# Patient Record
Sex: MALE | Race: WHITE | NOT HISPANIC OR LATINO
[De-identification: names, ages, dates, MRNs, and addresses within clinical notes are randomized per-mention and may not be internally consistent; named-entity substitution may affect disease eponyms.]

---

## 2017-11-10 ENCOUNTER — APPOINTMENT (OUTPATIENT)
Dept: CARDIOLOGY | Facility: CLINIC | Age: 62
End: 2017-11-10
Payer: COMMERCIAL

## 2017-11-10 VITALS
SYSTOLIC BLOOD PRESSURE: 110 MMHG | HEART RATE: 68 BPM | DIASTOLIC BLOOD PRESSURE: 76 MMHG | BODY MASS INDEX: 24.65 KG/M2 | WEIGHT: 186 LBS | HEIGHT: 73 IN

## 2017-11-10 PROBLEM — Z00.00 ENCOUNTER FOR PREVENTIVE HEALTH EXAMINATION: Status: ACTIVE | Noted: 2017-11-10

## 2017-11-10 PROCEDURE — 99244 OFF/OP CNSLTJ NEW/EST MOD 40: CPT

## 2017-11-13 RX ORDER — DOXYCYCLINE HYCLATE 100 MG/1
100 CAPSULE ORAL
Qty: 28 | Refills: 0 | Status: COMPLETED | COMMUNITY
Start: 2017-06-24

## 2017-11-13 RX ORDER — PREDNISONE 5 MG/1
5 TABLET ORAL
Qty: 60 | Refills: 0 | Status: COMPLETED | COMMUNITY
Start: 2017-06-19

## 2017-11-15 ENCOUNTER — APPOINTMENT (OUTPATIENT)
Dept: CARDIOLOGY | Facility: CLINIC | Age: 62
End: 2017-11-15
Payer: COMMERCIAL

## 2017-11-15 PROCEDURE — 93224 XTRNL ECG REC UP TO 48 HRS: CPT

## 2017-11-16 ENCOUNTER — APPOINTMENT (OUTPATIENT)
Dept: CARDIOLOGY | Facility: CLINIC | Age: 62
End: 2017-11-16
Payer: COMMERCIAL

## 2017-11-16 PROCEDURE — 93306 TTE W/DOPPLER COMPLETE: CPT

## 2017-11-17 ENCOUNTER — RECORD ABSTRACTING (OUTPATIENT)
Age: 62
End: 2017-11-17

## 2017-11-17 DIAGNOSIS — Z86.79 PERSONAL HISTORY OF OTHER DISEASES OF THE CIRCULATORY SYSTEM: ICD-10-CM

## 2017-11-17 DIAGNOSIS — Z78.9 OTHER SPECIFIED HEALTH STATUS: ICD-10-CM

## 2017-11-17 DIAGNOSIS — M17.10 UNILATERAL PRIMARY OSTEOARTHRITIS, UNSPECIFIED KNEE: ICD-10-CM

## 2017-11-22 ENCOUNTER — APPOINTMENT (OUTPATIENT)
Dept: CARDIOLOGY | Facility: CLINIC | Age: 62
End: 2017-11-22
Payer: COMMERCIAL

## 2017-11-22 VITALS
BODY MASS INDEX: 24.65 KG/M2 | HEIGHT: 73 IN | WEIGHT: 186 LBS | SYSTOLIC BLOOD PRESSURE: 142 MMHG | DIASTOLIC BLOOD PRESSURE: 88 MMHG | HEART RATE: 82 BPM | OXYGEN SATURATION: 98 %

## 2017-11-22 VITALS — WEIGHT: 186 LBS | HEIGHT: 73 IN | BODY MASS INDEX: 24.65 KG/M2

## 2017-11-22 PROCEDURE — 99214 OFFICE O/P EST MOD 30 MIN: CPT

## 2017-11-22 PROCEDURE — 93224 XTRNL ECG REC UP TO 48 HRS: CPT

## 2017-11-22 RX ORDER — MELOXICAM 7.5 MG/1
7.5 TABLET ORAL
Refills: 0 | Status: DISCONTINUED | COMMUNITY
End: 2017-11-22

## 2017-11-22 RX ORDER — TOBRAMYCIN 3 MG/ML
0.3 SOLUTION/ DROPS OPHTHALMIC
Qty: 5 | Refills: 0 | Status: COMPLETED | COMMUNITY
Start: 2017-10-25 | End: 2017-11-22

## 2017-11-22 RX ORDER — ROSUVASTATIN CALCIUM 10 MG/1
10 TABLET, FILM COATED ORAL
Refills: 0 | Status: DISCONTINUED | COMMUNITY
End: 2017-11-22

## 2017-11-29 ENCOUNTER — APPOINTMENT (OUTPATIENT)
Dept: CARDIOLOGY | Facility: CLINIC | Age: 62
End: 2017-11-29

## 2017-11-30 RX ORDER — AMLODIPINE BESYLATE 2.5 MG/1
2.5 TABLET ORAL DAILY
Refills: 0 | Status: DISCONTINUED | COMMUNITY
End: 2017-11-30

## 2018-01-02 ENCOUNTER — APPOINTMENT (OUTPATIENT)
Dept: ELECTROPHYSIOLOGY | Facility: CLINIC | Age: 63
End: 2018-01-02
Payer: COMMERCIAL

## 2018-01-02 VITALS
HEIGHT: 73 IN | BODY MASS INDEX: 24.65 KG/M2 | WEIGHT: 186 LBS | OXYGEN SATURATION: 99 % | DIASTOLIC BLOOD PRESSURE: 70 MMHG | SYSTOLIC BLOOD PRESSURE: 140 MMHG | HEART RATE: 82 BPM | RESPIRATION RATE: 14 BRPM

## 2018-01-02 PROCEDURE — 99245 OFF/OP CONSLTJ NEW/EST HI 55: CPT

## 2018-01-02 PROCEDURE — 93000 ELECTROCARDIOGRAM COMPLETE: CPT

## 2018-01-08 ENCOUNTER — NON-APPOINTMENT (OUTPATIENT)
Age: 63
End: 2018-01-08

## 2018-03-01 ENCOUNTER — OUTPATIENT (OUTPATIENT)
Dept: OUTPATIENT SERVICES | Facility: HOSPITAL | Age: 63
LOS: 1 days | End: 2018-03-01
Payer: COMMERCIAL

## 2018-03-01 VITALS
WEIGHT: 184.97 LBS | RESPIRATION RATE: 18 BRPM | SYSTOLIC BLOOD PRESSURE: 166 MMHG | HEART RATE: 65 BPM | TEMPERATURE: 98 F | OXYGEN SATURATION: 95 % | HEIGHT: 73 IN | DIASTOLIC BLOOD PRESSURE: 83 MMHG

## 2018-03-01 VITALS
SYSTOLIC BLOOD PRESSURE: 166 MMHG | DIASTOLIC BLOOD PRESSURE: 83 MMHG | RESPIRATION RATE: 18 BRPM | HEIGHT: 73 IN | HEART RATE: 69 BPM | TEMPERATURE: 98 F | OXYGEN SATURATION: 95 % | WEIGHT: 184.97 LBS

## 2018-03-01 DIAGNOSIS — M75.120 COMPLETE ROTATOR CUFF TEAR OR RUPTURE OF UNSPECIFIED SHOULDER, NOT SPECIFIED AS TRAUMATIC: Chronic | ICD-10-CM

## 2018-03-01 DIAGNOSIS — Z01.810 ENCOUNTER FOR PREPROCEDURAL CARDIOVASCULAR EXAMINATION: ICD-10-CM

## 2018-03-01 DIAGNOSIS — Z96.659 PRESENCE OF UNSPECIFIED ARTIFICIAL KNEE JOINT: Chronic | ICD-10-CM

## 2018-03-01 DIAGNOSIS — I48.0 PAROXYSMAL ATRIAL FIBRILLATION: ICD-10-CM

## 2018-03-01 DIAGNOSIS — Z98.890 OTHER SPECIFIED POSTPROCEDURAL STATES: Chronic | ICD-10-CM

## 2018-03-01 LAB
ANION GAP SERPL CALC-SCNC: 13 MMOL/L — SIGNIFICANT CHANGE UP (ref 5–17)
APTT BLD: 31.3 SEC — SIGNIFICANT CHANGE UP (ref 27.5–37.4)
BLD GP AB SCN SERPL QL: SIGNIFICANT CHANGE UP
BUN SERPL-MCNC: 25 MG/DL — HIGH (ref 8–20)
CALCIUM SERPL-MCNC: 9.6 MG/DL — SIGNIFICANT CHANGE UP (ref 8.6–10.2)
CHLORIDE SERPL-SCNC: 103 MMOL/L — SIGNIFICANT CHANGE UP (ref 98–107)
CO2 SERPL-SCNC: 25 MMOL/L — SIGNIFICANT CHANGE UP (ref 22–29)
CREAT SERPL-MCNC: 0.84 MG/DL — SIGNIFICANT CHANGE UP (ref 0.5–1.3)
GLUCOSE SERPL-MCNC: 100 MG/DL — SIGNIFICANT CHANGE UP (ref 70–115)
HCT VFR BLD CALC: 41.6 % — LOW (ref 42–52)
HGB BLD-MCNC: 14.7 G/DL — SIGNIFICANT CHANGE UP (ref 14–18)
INR BLD: 1.08 RATIO — SIGNIFICANT CHANGE UP (ref 0.88–1.16)
MAGNESIUM SERPL-MCNC: 2.2 MG/DL — SIGNIFICANT CHANGE UP (ref 1.6–2.6)
MCHC RBC-ENTMCNC: 29.9 PG — SIGNIFICANT CHANGE UP (ref 27–31)
MCHC RBC-ENTMCNC: 35.3 G/DL — SIGNIFICANT CHANGE UP (ref 32–36)
MCV RBC AUTO: 84.6 FL — SIGNIFICANT CHANGE UP (ref 80–94)
PLATELET # BLD AUTO: 163 K/UL — SIGNIFICANT CHANGE UP (ref 150–400)
POTASSIUM SERPL-MCNC: 3.9 MMOL/L — SIGNIFICANT CHANGE UP (ref 3.5–5.3)
POTASSIUM SERPL-SCNC: 3.9 MMOL/L — SIGNIFICANT CHANGE UP (ref 3.5–5.3)
PROTHROM AB SERPL-ACNC: 11.9 SEC — SIGNIFICANT CHANGE UP (ref 9.8–12.7)
RBC # BLD: 4.92 M/UL — SIGNIFICANT CHANGE UP (ref 4.6–6.2)
RBC # FLD: 13.8 % — SIGNIFICANT CHANGE UP (ref 11–15.6)
SODIUM SERPL-SCNC: 141 MMOL/L — SIGNIFICANT CHANGE UP (ref 135–145)
TYPE + AB SCN PNL BLD: SIGNIFICANT CHANGE UP
WBC # BLD: 7.1 K/UL — SIGNIFICANT CHANGE UP (ref 4.8–10.8)
WBC # FLD AUTO: 7.1 K/UL — SIGNIFICANT CHANGE UP (ref 4.8–10.8)

## 2018-03-01 PROCEDURE — 75572 CT HRT W/3D IMAGE: CPT

## 2018-03-01 PROCEDURE — 75572 CT HRT W/3D IMAGE: CPT | Mod: 26

## 2018-03-01 PROCEDURE — 93010 ELECTROCARDIOGRAM REPORT: CPT

## 2018-03-01 NOTE — H&P PST ADULT - PSH
Complete rotator cuff tear  right 2008  S/P hernia repair  inguinal bilateral  S/P knee replacement  right 2005

## 2018-03-01 NOTE — H&P PST ADULT - NSANTHOSAYNRD_GEN_A_CORE
No. ABDI screening performed.  STOP BANG Legend: 0-2 = LOW Risk; 3-4 = INTERMEDIATE Risk; 5-8 = HIGH Risk

## 2018-03-01 NOTE — H&P PST ADULT - HISTORY OF PRESENT ILLNESS
63 yo male with pmhx HTN, chronic myalgia on prednisone and PAF (CHADSVASc-2, HTN, DM) maintained on cardizem who presents for PST for elective cryo AF ablation.  Recent Holter monitor PAF and  paroxysmal atrial flutter 52-160bpm, average HR 73bpm, PVC vs aberrancy. +symptoms of palpitation during PAF.    TTE 11/16/17: LA 3.4cm, nml LVFx, mild MR, mild TR   SPECT 3/3/2015: normal myocardial perfusion and wall motion study 63 yo male with pmhx HTN, chronic myalgia on prednisone and PAF (CHADSVASc-1, HTN) maintained on cardizem who presents for PST for elective cryo AF ablation.  Recent Holter monitor PAF and  paroxysmal atrial flutter 52-160bpm, average HR 73bpm, PVC vs aberrancy. +symptoms of palpitation during PAF.    TTE 11/16/17: LA 3.4cm, nml LVFx, mild MR, mild TR   SPECT 3/3/2015: normal myocardial perfusion and wall motion study 61 yo male with pmhx HTN, chronic myalgia on prednisone and PAF (CHADSVASc-1, HTN) maintained on cardizem who presents for PST for elective cryo AF ablation.  Recent Holter monitor PAF and  paroxysmal atrial flutter 52-160bpm, average HR 73bpm, PVC vs aberrancy. +symptoms of palpitation during PAF, episodes exacerbated by caffeine and alcohol    TTE 11/16/17: LA 3.4cm, nml LVFx, mild MR, mild TR   SPECT 3/3/2015: normal myocardial perfusion and wall motion study

## 2018-03-07 ENCOUNTER — INPATIENT (INPATIENT)
Facility: HOSPITAL | Age: 63
LOS: 0 days | Discharge: HOME | DRG: 274 | End: 2018-03-08
Attending: INTERNAL MEDICINE | Admitting: INTERNAL MEDICINE
Payer: COMMERCIAL

## 2018-03-07 ENCOUNTER — TRANSCRIPTION ENCOUNTER (OUTPATIENT)
Age: 63
End: 2018-03-07

## 2018-03-07 VITALS
OXYGEN SATURATION: 98 % | HEART RATE: 60 BPM | RESPIRATION RATE: 20 BRPM | SYSTOLIC BLOOD PRESSURE: 141 MMHG | TEMPERATURE: 99 F | DIASTOLIC BLOOD PRESSURE: 81 MMHG

## 2018-03-07 DIAGNOSIS — I48.0 PAROXYSMAL ATRIAL FIBRILLATION: ICD-10-CM

## 2018-03-07 DIAGNOSIS — Z96.659 PRESENCE OF UNSPECIFIED ARTIFICIAL KNEE JOINT: Chronic | ICD-10-CM

## 2018-03-07 DIAGNOSIS — Z98.890 OTHER SPECIFIED POSTPROCEDURAL STATES: Chronic | ICD-10-CM

## 2018-03-07 DIAGNOSIS — Z01.810 ENCOUNTER FOR PREPROCEDURAL CARDIOVASCULAR EXAMINATION: ICD-10-CM

## 2018-03-07 DIAGNOSIS — M75.120 COMPLETE ROTATOR CUFF TEAR OR RUPTURE OF UNSPECIFIED SHOULDER, NOT SPECIFIED AS TRAUMATIC: Chronic | ICD-10-CM

## 2018-03-07 LAB — ABO RH CONFIRMATION: SIGNIFICANT CHANGE UP

## 2018-03-07 PROCEDURE — 93656 COMPRE EP EVAL ABLTJ ATR FIB: CPT

## 2018-03-07 PROCEDURE — 93010 ELECTROCARDIOGRAM REPORT: CPT

## 2018-03-07 PROCEDURE — 85610 PROTHROMBIN TIME: CPT

## 2018-03-07 PROCEDURE — G0463: CPT

## 2018-03-07 PROCEDURE — 93005 ELECTROCARDIOGRAM TRACING: CPT

## 2018-03-07 PROCEDURE — 86923 COMPATIBILITY TEST ELECTRIC: CPT

## 2018-03-07 PROCEDURE — 85730 THROMBOPLASTIN TIME PARTIAL: CPT

## 2018-03-07 PROCEDURE — 86850 RBC ANTIBODY SCREEN: CPT

## 2018-03-07 PROCEDURE — 86900 BLOOD TYPING SEROLOGIC ABO: CPT

## 2018-03-07 PROCEDURE — 36415 COLL VENOUS BLD VENIPUNCTURE: CPT

## 2018-03-07 PROCEDURE — 80048 BASIC METABOLIC PNL TOTAL CA: CPT

## 2018-03-07 PROCEDURE — 85027 COMPLETE CBC AUTOMATED: CPT

## 2018-03-07 PROCEDURE — 86901 BLOOD TYPING SEROLOGIC RH(D): CPT

## 2018-03-07 PROCEDURE — 93613 INTRACARDIAC EPHYS 3D MAPG: CPT

## 2018-03-07 PROCEDURE — 93662 INTRACARDIAC ECG (ICE): CPT | Mod: 26

## 2018-03-07 PROCEDURE — 83735 ASSAY OF MAGNESIUM: CPT

## 2018-03-07 RX ORDER — ALPRAZOLAM 0.25 MG
0.25 TABLET ORAL EVERY 6 HOURS
Qty: 0 | Refills: 0 | Status: DISCONTINUED | OUTPATIENT
Start: 2018-03-07 | End: 2018-03-08

## 2018-03-07 RX ORDER — ATORVASTATIN CALCIUM 80 MG/1
20 TABLET, FILM COATED ORAL AT BEDTIME
Qty: 0 | Refills: 0 | Status: DISCONTINUED | OUTPATIENT
Start: 2018-03-07 | End: 2018-03-08

## 2018-03-07 RX ORDER — LOSARTAN POTASSIUM 100 MG/1
100 TABLET, FILM COATED ORAL DAILY
Qty: 0 | Refills: 0 | Status: DISCONTINUED | OUTPATIENT
Start: 2018-03-07 | End: 2018-03-08

## 2018-03-07 RX ORDER — LOSARTAN/HYDROCHLOROTHIAZIDE 100MG-25MG
1 TABLET ORAL
Qty: 0 | Refills: 0 | COMMUNITY

## 2018-03-07 RX ORDER — FENTANYL CITRATE 50 UG/ML
25 INJECTION INTRAVENOUS
Qty: 0 | Refills: 0 | Status: DISCONTINUED | OUTPATIENT
Start: 2018-03-07 | End: 2018-03-08

## 2018-03-07 RX ORDER — OXYCODONE HYDROCHLORIDE 5 MG/1
5 TABLET ORAL EVERY 4 HOURS
Qty: 0 | Refills: 0 | Status: DISCONTINUED | OUTPATIENT
Start: 2018-03-07 | End: 2018-03-08

## 2018-03-07 RX ORDER — RIVAROXABAN 15 MG-20MG
20 KIT ORAL
Qty: 0 | Refills: 0 | Status: DISCONTINUED | OUTPATIENT
Start: 2018-03-07 | End: 2018-03-08

## 2018-03-07 RX ORDER — BENZOCAINE AND MENTHOL 5; 1 G/100ML; G/100ML
1 LIQUID ORAL
Qty: 0 | Refills: 0 | Status: DISCONTINUED | OUTPATIENT
Start: 2018-03-07 | End: 2018-03-08

## 2018-03-07 RX ORDER — ZOLPIDEM TARTRATE 10 MG/1
5 TABLET ORAL AT BEDTIME
Qty: 0 | Refills: 0 | Status: DISCONTINUED | OUTPATIENT
Start: 2018-03-07 | End: 2018-03-08

## 2018-03-07 RX ORDER — ROSUVASTATIN CALCIUM 5 MG/1
1 TABLET ORAL
Qty: 0 | Refills: 0 | COMMUNITY

## 2018-03-07 RX ORDER — ONDANSETRON 8 MG/1
4 TABLET, FILM COATED ORAL EVERY 6 HOURS
Qty: 0 | Refills: 0 | Status: DISCONTINUED | OUTPATIENT
Start: 2018-03-07 | End: 2018-03-08

## 2018-03-07 RX ORDER — PANTOPRAZOLE SODIUM 20 MG/1
40 TABLET, DELAYED RELEASE ORAL
Qty: 0 | Refills: 0 | Status: DISCONTINUED | OUTPATIENT
Start: 2018-03-07 | End: 2018-03-08

## 2018-03-07 RX ORDER — OXYCODONE HYDROCHLORIDE 5 MG/1
10 TABLET ORAL EVERY 4 HOURS
Qty: 0 | Refills: 0 | Status: DISCONTINUED | OUTPATIENT
Start: 2018-03-07 | End: 2018-03-08

## 2018-03-07 RX ORDER — ACETAMINOPHEN 500 MG
650 TABLET ORAL EVERY 6 HOURS
Qty: 0 | Refills: 0 | Status: DISCONTINUED | OUTPATIENT
Start: 2018-03-07 | End: 2018-03-08

## 2018-03-07 RX ORDER — HYDROCHLOROTHIAZIDE 25 MG
25 TABLET ORAL DAILY
Qty: 0 | Refills: 0 | Status: DISCONTINUED | OUTPATIENT
Start: 2018-03-07 | End: 2018-03-08

## 2018-03-07 RX ADMIN — RIVAROXABAN 20 MILLIGRAM(S): KIT at 18:32

## 2018-03-07 RX ADMIN — BENZOCAINE AND MENTHOL 1 LOZENGE: 5; 1 LIQUID ORAL at 19:00

## 2018-03-07 RX ADMIN — ZOLPIDEM TARTRATE 5 MILLIGRAM(S): 10 TABLET ORAL at 21:18

## 2018-03-07 RX ADMIN — Medication 30 MILLILITER(S): at 17:07

## 2018-03-07 RX ADMIN — ATORVASTATIN CALCIUM 20 MILLIGRAM(S): 80 TABLET, FILM COATED ORAL at 21:18

## 2018-03-07 NOTE — DISCHARGE NOTE ADULT - MEDICATION SUMMARY - MEDICATIONS TO STOP TAKING
I will STOP taking the medications listed below when I get home from the hospital:    dilTIAZem 240 mg/24 hours oral tablet, extended release  -- 1 tab(s) by mouth once a day

## 2018-03-07 NOTE — DISCHARGE NOTE ADULT - PATIENT PORTAL LINK FT
You can access the [facility], offered by [facility], by registering with the following website: [email or web address]

## 2018-03-07 NOTE — PROGRESS NOTE ADULT - SUBJECTIVE AND OBJECTIVE BOX
Pt seen and examined in RR s/p cryo AF ablation (PVI) via b/l FV.  Dr Rodrigues bedside, no complaints.  In: 1.1 liter out: zero    ECG: NSR non specific st-t wave changes    MEDICATIONS  (STANDING):  atorvastatin 20 milliGRAM(s) Oral at bedtime  hydrochlorothiazide 25 milliGRAM(s) Oral daily  losartan 100 milliGRAM(s) Oral daily  pantoprazole    Tablet 40 milliGRAM(s) Oral before breakfast  predniSONE   Tablet 10 milliGRAM(s) Oral daily  rivaroxaban 20 milliGRAM(s) Oral <User Schedule>    MEDICATIONS  (PRN):  acetaminophen   Tablet. 650 milliGRAM(s) Oral every 6 hours PRN Mild Pain (1 - 3)  ALPRAZolam 0.25 milliGRAM(s) Oral every 6 hours PRN anxiety and/or insomnia  aluminum hydroxide/magnesium hydroxide/simethicone Suspension 30 milliLiter(s) Oral every 4 hours PRN Dyspepsia  benzocaine 15 mG/menthol 3.6 mG Lozenge 1 Lozenge Oral every 2 hours PRN Sore Throat  fentaNYL    Injectable 25 MICROGram(s) IV Push every 30 minutes PRN Severe Pain (7 - 10)  ondansetron Injectable 4 milliGRAM(s) IV Push every 6 hours PRN Nausea and/or Vomiting  oxyCODONE    IR 10 milliGRAM(s) Oral every 4 hours PRN Severe Pain (7 - 10)  oxyCODONE    IR 5 milliGRAM(s) Oral every 4 hours PRN Moderate Pain (4 - 6)      PAST MEDICAL & SURGICAL HISTORY:  Atrial flutter  Paroxysmal atrial fibrillation  HTN (hypertension)  S/P hernia repair: inguinal bilateral  Complete rotator cuff tear: right 2008  S/P knee replacement: right 2005      Vital Signs Last 24 Hrs  HR: 80  BP: 150/76  RR: 18  SpO2: 93% 2L    Physical Exam:  Constitutional: NAD, sleepy, arousable  Cardiovascular: +S1S2 RRR  Pulmonary: CTA b/l, unlabored  GI: soft NTND +BS  Extremities: no pedal edema, +distal pulses b/l,   Groins: +sutures b/l, left with scant dried heme, no swelling or hematoma  Neuro: non focal, ALCANTARA x4  Right radial lorna site: benign    A/P: 63 yo male with pmhx HTN, chronic myalgia on prednisone and PAF (CHADSVASc-1, HTN) maintained on cardizem who presents for elective cryo AF ablation.  Recent Holter monitor with PAF and  paroxysmal atrial flutter 52-160bpm, average HR 73bpm, PVC vs aberrancy. +symptoms of palpitation during PAF.  He is now s/p cryo AF ablation via bilateral FV, currently in NSR.    -admit to telemetry  -bedrest x 4 hours, groin protocol   -am labs and ECG  -DC radial lorna in am post lab draw  -start xarelto at 7pm and continue nightly - uninterrupted  -add protonix 40mg po daily x 30 days  -discontinue diltiazem  -OOB to chair and ambulate at 8:30pm if groins stable  -prn tylenol, percocet, xanax, zofran    above d/w nursing staff and Dr Telles Pt seen and examined in RR s/p cryo AF ablation (PVI) via b/l FV.  Dr Rodrigues bedside, no complaints.  In: 1.1 liter out: zero    ECG: NSR non specific st-t wave changes    MEDICATIONS  (STANDING):  atorvastatin 20 milliGRAM(s) Oral at bedtime  hydrochlorothiazide 25 milliGRAM(s) Oral daily  losartan 100 milliGRAM(s) Oral daily  pantoprazole    Tablet 40 milliGRAM(s) Oral before breakfast  predniSONE   Tablet 10 milliGRAM(s) Oral daily  rivaroxaban 20 milliGRAM(s) Oral <User Schedule>    MEDICATIONS  (PRN):  acetaminophen   Tablet. 650 milliGRAM(s) Oral every 6 hours PRN Mild Pain (1 - 3)  ALPRAZolam 0.25 milliGRAM(s) Oral every 6 hours PRN anxiety and/or insomnia  aluminum hydroxide/magnesium hydroxide/simethicone Suspension 30 milliLiter(s) Oral every 4 hours PRN Dyspepsia  benzocaine 15 mG/menthol 3.6 mG Lozenge 1 Lozenge Oral every 2 hours PRN Sore Throat  fentaNYL    Injectable 25 MICROGram(s) IV Push every 30 minutes PRN Severe Pain (7 - 10)  ondansetron Injectable 4 milliGRAM(s) IV Push every 6 hours PRN Nausea and/or Vomiting  oxyCODONE    IR 10 milliGRAM(s) Oral every 4 hours PRN Severe Pain (7 - 10)  oxyCODONE    IR 5 milliGRAM(s) Oral every 4 hours PRN Moderate Pain (4 - 6)      PAST MEDICAL & SURGICAL HISTORY:  Atrial flutter  Paroxysmal atrial fibrillation  HTN (hypertension)  S/P hernia repair: inguinal bilateral  Complete rotator cuff tear: right 2008  S/P knee replacement: right 2005      Vital Signs Last 24 Hrs  HR: 80  BP: 150/76  RR: 18  SpO2: 93% 2L    Physical Exam:  Constitutional: NAD, sleepy, arousable  Cardiovascular: +S1S2 RRR  Pulmonary: CTA b/l, unlabored  GI: soft NTND +BS  Extremities: no pedal edema, +distal pulses b/l,   Groins: +sutures b/l, left with scant dried heme, no swelling or hematoma  Neuro: non focal, ALCANTARA x4  Right radial lorna site: benign    A/P: 63 yo male with pmhx HTN, chronic myalgia on prednisone and PAF (CHADSVASc-1, HTN) maintained on cardizem who presents for elective cryo AF ablation.  Recent Holter monitor with PAF and  paroxysmal atrial flutter 52-160bpm, average HR 73bpm, PVC vs aberrancy. +symptoms of palpitation during PAF.  He is now s/p cryo AF ablation via bilateral FV, currently in NSR.    -admit to telemetry  -bedrest x 4 hours, groin protocol   -am labs and ECG  -DC radial lorna in am post lab draw  -start xarelto at 7pm and continue nightly - uninterrupted  -add protonix 40mg po daily x 30 days  -discontinue diltiazem  -OOB to chair and ambulate at 8:30pm if groins stable  -prn tylenol, percocet, xanax, zofran    above d/w nursing staff and Dr Rodrigues

## 2018-03-07 NOTE — DISCHARGE NOTE ADULT - PLAN OF CARE
minimize arrhythmia burden - Bruising at the groin, sometimes extending down the leg, and/or a small lump under the skin at the groin access site is normal and will resolve within 2 – 3 weeks.   - Occasional skipped beats or palpitations that last for a few beats are common and generally resolve within 1-2 months.   - You may walk and take stairs at a regular pace.   - Do not perform any exercise more strenuous than walking for 1 week.   - Do not strain or lift heavy objects for 1 week.  - You may shower the day after the procedure.  - Do not soak in water (such as tub baths, hot tubs, swimming, etc.) for 1 week.   - You may resume all other activities the day after the procedure.  Call your doctor if:   - you notice bleeding, redness, drainage, swelling, increased tenderness or a hot sensation around the catheter insertion site.   - your temperature is greater than 100 degrees F for more than 24 hours.  - your rapid heart rhythm returns.  - you have any questions or concerns regarding the procedure.  If significant bleeding and/or a large lump (the size of a golf ball or bigger) occurs:  - Lie flat and apply continuous direct pressure just above the puncture site for at least 10 minutes  - If the issue resolves, notify your physician immediately.    - If the bleeding cannot be controlled, please seek immediate medical attention.  If you experience increased difficulty breathing or chest pain, or if you faint or have dizzy spells, please seek immediate medical attention.

## 2018-03-07 NOTE — DISCHARGE NOTE ADULT - HOSPITAL COURSE
61 yo male with pmhx HTN, chronic myalgia on prednisone and symptomatic PAF (CHADSVASc-1, HTN) maintained on cardizem. He presented electively 3/7/18 63 yo male with pmhx HTN, chronic myalgia on prednisone and symptomatic PAF (CHADSVASc-1, HTN) maintained on cardizem. He presented electively 3/7/18 and is now s/p successful ablation of atrial fibrillation.

## 2018-03-07 NOTE — DISCHARGE NOTE ADULT - CARE PROVIDER_API CALL
Jose Carlos Rodrigues), Cardiac Electrophysiology; Cardiovascular Disease; Internal Medicine  [address] [place] [address]  Phone: [phone]  Fax: [phone]

## 2018-03-07 NOTE — DISCHARGE NOTE ADULT - MEDICATION SUMMARY - MEDICATIONS TO TAKE
I will START or STAY ON the medications listed below when I get home from the hospital:    predniSONE 10 mg oral tablet  -- 1 tab(s) by mouth once a day  -- Indication: For Steroid    rivaroxaban 20 mg oral tablet  -- 1 tab(s) by mouth   -- Indication: For AFib    rosuvastatin 5 mg oral tablet  -- 1 tab(s) by mouth once a day (at bedtime)  -- Indication: For HLD    losartan-hydroCHLOROthiazide 100 mg-25 mg oral tablet  -- 1 tab(s) by mouth once a day  -- Indication: For HTN    pantoprazole 40 mg oral delayed release tablet  -- 1 tab(s) by mouth once a day (before a meal) x 30 days   -- Indication: For AFib I will START or STAY ON the medications listed below when I get home from the hospital:    predniSONE 10 mg oral tablet  -- 1 tab(s) by mouth once a day  -- Indication: For Steroid    rivaroxaban 20 mg oral tablet  -- 1 tab(s) by mouth once a day (in the evening)   -- Indication: For stroke prevention    rosuvastatin 5 mg oral tablet  -- 1 tab(s) by mouth once a day (at bedtime)  -- Indication: For HLD    losartan-hydroCHLOROthiazide 100 mg-25 mg oral tablet  -- 1 tab(s) by mouth once a day  -- Indication: For HTN    pantoprazole 40 mg oral delayed release tablet  -- 1 tab(s) by mouth once a day (before a meal) x 30 days   -- Indication: For AFib

## 2018-03-07 NOTE — PROGRESS NOTE ADULT - SUBJECTIVE AND OBJECTIVE BOX
Admission Criteria  Please admit the patient to the following service:    Major Criteria:  - Continuous EKG monitoring is required for condition causing arrhythmia (hyperkalemia, etc)  - Significant volume overload > 200 ml      Admit to: (1 Major ciriteria/2 or more minor criteria) Patient is being admitted to the inpatient service due to high risk characteristics and need for further management/monitoring and is considered to be at a significantly increased risk of major adverse cardiac and vascular events if discharged.

## 2018-03-07 NOTE — DISCHARGE NOTE ADULT - CARE PLAN
Principal Discharge DX:	Paroxysmal atrial fibrillation  Goal:	minimize arrhythmia burden  Assessment and plan of treatment:	- Bruising at the groin, sometimes extending down the leg, and/or a small lump under the skin at the groin access site is normal and will resolve within 2 – 3 weeks.   - Occasional skipped beats or palpitations that last for a few beats are common and generally resolve within 1-2 months.   - You may walk and take stairs at a regular pace.   - Do not perform any exercise more strenuous than walking for 1 week.   - Do not strain or lift heavy objects for 1 week.  - You may shower the day after the procedure.  - Do not soak in water (such as tub baths, hot tubs, swimming, etc.) for 1 week.   - You may resume all other activities the day after the procedure.  Call your doctor if:   - you notice bleeding, redness, drainage, swelling, increased tenderness or a hot sensation around the catheter insertion site.   - your temperature is greater than 100 degrees F for more than 24 hours.  - your rapid heart rhythm returns.  - you have any questions or concerns regarding the procedure.  If significant bleeding and/or a large lump (the size of a golf ball or bigger) occurs:  - Lie flat and apply continuous direct pressure just above the puncture site for at least 10 minutes  - If the issue resolves, notify your physician immediately.    - If the bleeding cannot be controlled, please seek immediate medical attention.  If you experience increased difficulty breathing or chest pain, or if you faint or have dizzy spells, please seek immediate medical attention.

## 2018-03-08 VITALS — HEART RATE: 84 BPM | RESPIRATION RATE: 16 BRPM | TEMPERATURE: 99 F | OXYGEN SATURATION: 95 %

## 2018-03-08 LAB
ANION GAP SERPL CALC-SCNC: 16 MMOL/L — SIGNIFICANT CHANGE UP (ref 5–17)
BUN SERPL-MCNC: 18 MG/DL — SIGNIFICANT CHANGE UP (ref 8–20)
CALCIUM SERPL-MCNC: 9.1 MG/DL — SIGNIFICANT CHANGE UP (ref 8.6–10.2)
CHLORIDE SERPL-SCNC: 101 MMOL/L — SIGNIFICANT CHANGE UP (ref 98–107)
CO2 SERPL-SCNC: 21 MMOL/L — LOW (ref 22–29)
CREAT SERPL-MCNC: 0.66 MG/DL — SIGNIFICANT CHANGE UP (ref 0.5–1.3)
GLUCOSE SERPL-MCNC: 134 MG/DL — HIGH (ref 70–115)
HCT VFR BLD CALC: 36.8 % — LOW (ref 42–52)
HGB BLD-MCNC: 13.3 G/DL — LOW (ref 14–18)
MAGNESIUM SERPL-MCNC: 1.9 MG/DL — SIGNIFICANT CHANGE UP (ref 1.6–2.6)
MCHC RBC-ENTMCNC: 30.4 PG — SIGNIFICANT CHANGE UP (ref 27–31)
MCHC RBC-ENTMCNC: 36.1 G/DL — HIGH (ref 32–36)
MCV RBC AUTO: 84 FL — SIGNIFICANT CHANGE UP (ref 80–94)
PLATELET # BLD AUTO: 118 K/UL — LOW (ref 150–400)
POTASSIUM SERPL-MCNC: 3.7 MMOL/L — SIGNIFICANT CHANGE UP (ref 3.5–5.3)
POTASSIUM SERPL-SCNC: 3.7 MMOL/L — SIGNIFICANT CHANGE UP (ref 3.5–5.3)
RBC # BLD: 4.38 M/UL — LOW (ref 4.6–6.2)
RBC # FLD: 13.4 % — SIGNIFICANT CHANGE UP (ref 11–15.6)
SODIUM SERPL-SCNC: 138 MMOL/L — SIGNIFICANT CHANGE UP (ref 135–145)
WBC # BLD: 11.4 K/UL — HIGH (ref 4.8–10.8)
WBC # FLD AUTO: 11.4 K/UL — HIGH (ref 4.8–10.8)

## 2018-03-08 PROCEDURE — 93005 ELECTROCARDIOGRAM TRACING: CPT

## 2018-03-08 PROCEDURE — 80048 BASIC METABOLIC PNL TOTAL CA: CPT

## 2018-03-08 PROCEDURE — 83735 ASSAY OF MAGNESIUM: CPT

## 2018-03-08 PROCEDURE — C1730: CPT

## 2018-03-08 PROCEDURE — C1759: CPT

## 2018-03-08 PROCEDURE — 36415 COLL VENOUS BLD VENIPUNCTURE: CPT

## 2018-03-08 PROCEDURE — 85027 COMPLETE CBC AUTOMATED: CPT

## 2018-03-08 PROCEDURE — 93010 ELECTROCARDIOGRAM REPORT: CPT

## 2018-03-08 PROCEDURE — 93657 TX L/R ATRIAL FIB ADDL: CPT

## 2018-03-08 PROCEDURE — 93656 COMPRE EP EVAL ABLTJ ATR FIB: CPT

## 2018-03-08 PROCEDURE — C1733: CPT

## 2018-03-08 PROCEDURE — 93613 INTRACARDIAC EPHYS 3D MAPG: CPT

## 2018-03-08 PROCEDURE — C1893: CPT

## 2018-03-08 PROCEDURE — C1766: CPT

## 2018-03-08 PROCEDURE — C1894: CPT

## 2018-03-08 PROCEDURE — C1769: CPT

## 2018-03-08 RX ORDER — RIVAROXABAN 15 MG-20MG
1 KIT ORAL
Qty: 0 | Refills: 0 | COMMUNITY
Start: 2018-03-08

## 2018-03-08 RX ORDER — PANTOPRAZOLE SODIUM 20 MG/1
1 TABLET, DELAYED RELEASE ORAL
Qty: 30 | Refills: 0 | OUTPATIENT
Start: 2018-03-08 | End: 2018-04-06

## 2018-03-08 RX ORDER — DILTIAZEM HCL 120 MG
1 CAPSULE, EXT RELEASE 24 HR ORAL
Qty: 0 | Refills: 0 | COMMUNITY

## 2018-03-08 RX ORDER — RIVAROXABAN 15 MG-20MG
1 KIT ORAL
Qty: 30 | Refills: 2 | OUTPATIENT
Start: 2018-03-08 | End: 2018-06-05

## 2018-03-08 RX ADMIN — Medication 25 MILLIGRAM(S): at 06:20

## 2018-03-08 RX ADMIN — PANTOPRAZOLE SODIUM 40 MILLIGRAM(S): 20 TABLET, DELAYED RELEASE ORAL at 06:20

## 2018-03-08 RX ADMIN — Medication 10 MILLIGRAM(S): at 06:20

## 2018-03-08 RX ADMIN — LOSARTAN POTASSIUM 100 MILLIGRAM(S): 100 TABLET, FILM COATED ORAL at 06:20

## 2018-03-08 NOTE — CHART NOTE - NSCHARTNOTEFT_GEN_A_CORE
[place] [address]  Electrophysiology Lab    Atrial Fibrillation Ablation (Pulmonary Vein Isolation)    Electrophysiologic Study with Ablation    Patient Information    Patient Name		Oswaldo Gonzalez  Procedure Date		2018  MRN			[id number]  			1955  Age			62 years  Gender			Male     Electrophysiologist	Jose Carlos Rodrigues MD    Patient History  	This patient is a pleasant 62 year old male w/ recurrent symptomatic paroxysmal atrial fibrillation.    Indication:	PAF (I48.0)    Procedure  Prep    Radial Arterial Line was placed and General anesthesia was used and the patient was intubated by the anesthesia staff.  An esophageal temperature probe was placed and confirmed fluoroscopically at the level of the pulmonary vein ostia.  CASS surface patches were placed for 3D Mapping.  The right and left groins were prepped with chlorhexidine and draped with sterile technique.    Access    Left Femoral venous access was obtained on 2 occasions via seldinger technique.   #11, #7 [place] short sheaths were placed into the left femoral vein.  Right Femoral venous access was obtained on 2 occasions via seldinger technique.  #12 and #7 [place] short sheaths were placed into the right femoral vein.  Anticoagulation was started intravenously with IV Heparin and ACTs were drawn periodically to a target of 350-400 seconds.  An [employer] intracardiac ultrasound catheter was placed into the left sided #11 [place] sheath and brought up to the heart.  Baseline images were obtained.          A decapolar catheter was brought up from the left femoral  and the ability to achieve right sided diaphragmatic stimulation was confirmed via pacing from this catheter.  A second decapolar catheter was brought up from the right femoral vein #7 sheath and brought into the coronary sinus.           Transseptal    An SL1 Catheter was brought into the region just inferior and slightly anterior to the fossa ovalis along the right atrial septum and with a 71cm [place]rough catheter transseptal catheterization was performed with ICE guidance, fluoroscopy as well pressure guidance to confirm the crossing of the septum and a BMW wire was brought into the left superior pulmonary vein. The SL1 was then tracked over the wire and guided into the mid body of the left atrium.    Much care was taken to create a completely bubble free catheter system for left sided catheterization.    A stiff Amplatz J-wire (180cm) was then brought into the left upper pulmonary vein via the SL1 Sheath.  The SL1 Sheath as well as the short #12 Right femoral venous sheath were removed over the Amplatz wire as it was held in place in the left upper pulmonary vein.      An Artic Front Flex Cath Sheath and its dilator was then brought in via the right femoral vein over the Amplatz wire and carefully delivered across the atrial septum as it tracked over the wire and into the main body of the left atrium.  The Amplatz wire and dilator were removed and the Flex Cath Sheath was positioned in the mid-body of the left atrium.  Next a prepped and bubble free Cryo Cath Artic Front Balloon and Achieve mapping Catheter were delivered into the left atrium through the Flex Cath Sheath.    MAP/Ablate    Using the NAVX 3D mapping system and the Achieve Intracardiac mapping catheter as well as with merged data from a previously taken CT Angiogram a shell of the left atrium was rendered with special attention made to the precise location and morphology of each of the pulmonary veins as well as their openings into the left atrium.    The Achieve was directed into each of the Pulmonary Veins and the Artic Front Balloon (28mm – larger sized) was deployed and delivered to the OS of these Veins.  Cryo ablation was performed of each of the.  Entry and Exit block was confirmed using the Achieve Mapping Catheter  During right sided Pulmonary Vein Cryo Ablation diaphragmatic pacing was performed and using manual palpation.  These methods were used to maximize protection from any phrenic nerve injury.    Esophageal temperatures were monitored during Cryo Ablation to avoid injury to the esophagus.       The  Artic Front Balloon and Achieve mapping  Catheter along with the Flex Cath were brought back from the left atrium and down into the Inferior Vena Cava.     The decapolar catheters were then removed from the body.  ICE confirmed no pericardial effusion at the completion of the procedure and the Accunav Catheter was then removed from the body.  Anticoagulation was reversed.       All 4 sheaths were removed and a figure of 8 ethibond stitch was placed in both groins to achieve hemostasis.   Pressure was held and adequate hemostasis was obtained.  The patient was brought to the Recovery Unit for close observation during the post-procedural period.               Results  All 4 pulmonary veins were approached with the Artic Front Balloon catheter and underwent cryoablation.  There was common antrum for the left veins and both upper and lower veins were directly observed as isolated by delivering the ablation lesion to the common antrum as the larger balloon fit very well into this vestibule.  The achieve catheter was placed into the early entrance of each pulmonary vein and the ability to capture the atrium by pacing from these locations with the achieve catheter was confirmed and marked w/ the CASS mapping system.  After ablation applications confirmation of entry block in each of the veins was demonstrated.  Exit blocked was confirmed by pacing from the prior marked location within each of the veins to demonstrate the new creation of block from within the vein appropriately.     All 4 veins were ablated by the completion of the procedure.        LA pressure:  18/4/11mmHg         Contrast:   0cc  EBL <15cc        Normal Sinus Node Testing. Normal AV Node physiology. Normal His-Purkinje system. No evidence of accessory bypass tract.  There was no retrograde atrial Conduction with ventricular pacing even with Isuprel. No inducible supraventricular tachyarrhythmias.  No inducible ventricular tachyarrhythmias.  No inducible arrhythmias.           SCL  1052ms  RI   159ms		QRS  74ms		QT    480ms  		  		  cSNRT <300ms  	   AVNBCL    320ms         VABCL 300ms    Complications: None	    Summary:	   •	Successful Pulmonary Vein Isolation via Cryoballoon ablation      Recommendations:	    1.	Start Xarelto 20mg Qpm – First dose at 5 pm  2.	Protonix  40 mg for up to one month daily  3.	D/C Diltiazem  4.	Observe on telemetry overnight tonight and discharge in the morning if stable.  5.	Remove Figure of 8 stitches from both groin sites in the am prior to discharge home  6.	Followup in the office in 4 weeks           Jose Carlos Rodrigues MD, RS, [facility]   Cardiac Electrophysiologist - SHELLEY Sanchez   of Cardiology  - [employer]

## 2018-03-08 NOTE — PROGRESS NOTE ADULT - SUBJECTIVE AND OBJECTIVE BOX
Patient seen today in bed. No acute overnight complaints. Figure 8 sutures removed without complaint or complication.     EKG: SR at 90 bpm; QRSD 88ms  TELE: SR, no events    MEDICATIONS  (STANDING):  atorvastatin 20 milliGRAM(s) Oral at bedtime  hydrochlorothiazide 25 milliGRAM(s) Oral daily  losartan 100 milliGRAM(s) Oral daily  pantoprazole    Tablet 40 milliGRAM(s) Oral before breakfast  predniSONE   Tablet 10 milliGRAM(s) Oral daily  rivaroxaban 20 milliGRAM(s) Oral <User Schedule>    MEDICATIONS  (PRN):  acetaminophen   Tablet. 650 milliGRAM(s) Oral every 6 hours PRN Mild Pain (1 - 3)  ALPRAZolam 0.25 milliGRAM(s) Oral every 6 hours PRN anxiety and/or insomnia  aluminum hydroxide/magnesium hydroxide/simethicone Suspension 30 milliLiter(s) Oral every 4 hours PRN Dyspepsia  benzocaine 15 mG/menthol 3.6 mG Lozenge 1 Lozenge Oral every 2 hours PRN Sore Throat  fentaNYL    Injectable 25 MICROGram(s) IV Push every 30 minutes PRN Severe Pain (7 - 10)  ondansetron Injectable 4 milliGRAM(s) IV Push every 6 hours PRN Nausea and/or Vomiting  oxyCODONE    IR 10 milliGRAM(s) Oral every 4 hours PRN Severe Pain (7 - 10)  oxyCODONE    IR 5 milliGRAM(s) Oral every 4 hours PRN Moderate Pain (4 - 6)  zolpidem 5 milliGRAM(s) Oral at bedtime PRN Insomnia    Allergies  No Known Allergies    PAST MEDICAL & SURGICAL HISTORY:  Atrial flutter  Paroxysmal atrial fibrillation  HTN (hypertension)  S/P hernia repair: inguinal bilateral  Complete rotator cuff tear: right 2008  S/P knee replacement: right 2005    Vital Signs Last 24 Hrs  T(C): 37.1 (08 Mar 2018 06:19), Max: 37.3 (07 Mar 2018 10:58)  T(F): 98.7 (08 Mar 2018 06:19), Max: 99.1 (07 Mar 2018 10:58)  HR: 84 (08 Mar 2018 06:19) (60 - 88)  BP: 135/72 (07 Mar 2018 18:00) (129/71 - 152/81)  RR: 16 (08 Mar 2018 06:19) (14 - 20)  SpO2: 95% (08 Mar 2018 06:19) (93% - 98%)    Physical Exam:  Constitutional: NAD, AAOx3  Cardiovascular: +S1S2 RRR  Pulmonary: CTA b/l, unlabored  GI: soft NTND +BS  Extremities: no pedal edema  Right Groin: No hematoma, small area of ecchymosis  Left Groin: No hematoma.   Neuro: non focal, ALCANTARA x4    LABS:                        13.3   11.4  )-----------( 118      ( 08 Mar 2018 05:52 )             36.8     03-08    138  |  101  |  18.0  ----------------------------<  134<H>  3.7   |  21.0<L>  |  0.66    Ca    9.1      08 Mar 2018 05:52  Mg     1.9     03-08    A/P  61 yo male with pmhx HTN, chronic myalgia on prednisone and PAF (CHADSVASc-1, HTN) maintained on Cardizem.  He is now s/p cryo AF ablation via bilateral FV, currently in NSR.    - Discharge home today.

## 2018-04-06 PROBLEM — I48.92 UNSPECIFIED ATRIAL FLUTTER: Chronic | Status: ACTIVE | Noted: 2018-03-01

## 2018-04-06 PROBLEM — I48.0 PAROXYSMAL ATRIAL FIBRILLATION: Chronic | Status: ACTIVE | Noted: 2018-03-01

## 2018-04-06 PROBLEM — I10 ESSENTIAL (PRIMARY) HYPERTENSION: Chronic | Status: ACTIVE | Noted: 2018-03-01

## 2018-05-08 ENCOUNTER — APPOINTMENT (OUTPATIENT)
Dept: ELECTROPHYSIOLOGY | Facility: CLINIC | Age: 63
End: 2018-05-08
Payer: COMMERCIAL

## 2018-05-08 ENCOUNTER — NON-APPOINTMENT (OUTPATIENT)
Age: 63
End: 2018-05-08

## 2018-05-08 VITALS
BODY MASS INDEX: 24.52 KG/M2 | WEIGHT: 185 LBS | HEIGHT: 73 IN | DIASTOLIC BLOOD PRESSURE: 70 MMHG | RESPIRATION RATE: 14 BRPM | HEART RATE: 82 BPM | OXYGEN SATURATION: 99 % | SYSTOLIC BLOOD PRESSURE: 126 MMHG

## 2018-05-08 PROCEDURE — 99214 OFFICE O/P EST MOD 30 MIN: CPT

## 2018-05-08 PROCEDURE — 93000 ELECTROCARDIOGRAM COMPLETE: CPT

## 2018-09-28 ENCOUNTER — RECORD ABSTRACTING (OUTPATIENT)
Age: 63
End: 2018-09-28

## 2018-09-28 ENCOUNTER — APPOINTMENT (OUTPATIENT)
Dept: CARDIOLOGY | Facility: CLINIC | Age: 63
End: 2018-09-28
Payer: COMMERCIAL

## 2018-09-28 VITALS
HEIGHT: 73 IN | DIASTOLIC BLOOD PRESSURE: 82 MMHG | HEART RATE: 80 BPM | BODY MASS INDEX: 25.18 KG/M2 | WEIGHT: 190 LBS | OXYGEN SATURATION: 96 % | SYSTOLIC BLOOD PRESSURE: 138 MMHG

## 2018-09-28 PROCEDURE — 99214 OFFICE O/P EST MOD 30 MIN: CPT

## 2019-03-26 ENCOUNTER — RECORD ABSTRACTING (OUTPATIENT)
Age: 64
End: 2019-03-26

## 2019-03-28 ENCOUNTER — NON-APPOINTMENT (OUTPATIENT)
Age: 64
End: 2019-03-28

## 2019-03-28 ENCOUNTER — RECORD ABSTRACTING (OUTPATIENT)
Age: 64
End: 2019-03-28

## 2019-03-28 ENCOUNTER — APPOINTMENT (OUTPATIENT)
Dept: CARDIOLOGY | Facility: CLINIC | Age: 64
End: 2019-03-28
Payer: COMMERCIAL

## 2019-03-28 VITALS
HEART RATE: 79 BPM | WEIGHT: 172 LBS | SYSTOLIC BLOOD PRESSURE: 120 MMHG | BODY MASS INDEX: 22.8 KG/M2 | HEIGHT: 73 IN | OXYGEN SATURATION: 98 % | DIASTOLIC BLOOD PRESSURE: 70 MMHG

## 2019-03-28 DIAGNOSIS — Z87.898 PERSONAL HISTORY OF OTHER SPECIFIED CONDITIONS: ICD-10-CM

## 2019-03-28 PROCEDURE — 99214 OFFICE O/P EST MOD 30 MIN: CPT

## 2019-03-28 PROCEDURE — 93000 ELECTROCARDIOGRAM COMPLETE: CPT

## 2019-03-28 NOTE — ASSESSMENT
[FreeTextEntry1] : ASSESSMENT AND RECOMMENDATIONS:\par This is a 63-year-old white male with past medical history as detailed above, seen today, with the following active issues:\par Paroxysmal atrial fibrillation/aflutter, status post cryoablation.  No symptomatic recurrence.  We discussed potential recurrence and instructions to contact us for symptoms.  He has been taken off diltiazem and Xarelto by Dr. Rodrigues.\par Hypertension.   No CHF, no renal insufficiency. Nonsmoker. very well controlled\par Dyslipidemia.  He has been tolerating his rosuvastatin 10 mg . \par \par Recommended to follow his pulse and blood pressure regularly.\par Recommended to follow electrolytes. Lacunar regular basis\par \par Counseling regarding low saturated fat, salt and carbohydrate intake was reviewed. Active lifestyle and regular. Exercise along with weight management is advised.\par All the above were at length reviewed. Answered all the questions. Thank you very much for this kind referral. Please do not hesitate to give me a call for any question.\par Part of this transcription was done with voice recognition software and phonetically similar errors are common. I apologize for that. Please donot hesitate to call for any questions due to above.\par Follow up in one year or for any change\par

## 2019-03-28 NOTE — HISTORY OF PRESENT ILLNESS
[FreeTextEntry1] : HPI:\par I had the pleasure of seeing Mr. Gonzalez on September 28, 2018.  He presents to the office for yearly cardiovascular follow up.  Since he was last seen, he was evaluated by Dr. Rodrigues for his paroxysmal atrial fibrillation.  He underwent cryoablation in March 2018.  This appears to be successful with no recurrent symptoms.  He was taken off Xarelto and off diltiazem by Dr. Rodrigues.  On this regimen, his blood pressures have gradually crept up.  They currently range as low as 130 and as high as 150.  This has not been associated to any symptoms.  He denies any change in dietary habits including any change in salt intake.  He denies over-the-counter allergy preparations or caffeine use.\par \par Atrial fibrillation.  As above.  No recurrent symptoms.\par Dyslipidemia.  Never did restart rosuvastatin.  He has gradually weaned off the prednisone taking for his polymyalgia.  He has been off steroids altogether for several weeks with no myalgias.\par \par Cardiovascular review of systems is negative for chest pain, presyncope, syncope, edema, claudication, or CNS events.\par

## 2019-03-28 NOTE — PHYSICAL EXAM
[General Appearance - Well Developed] : well developed [Normal Appearance] : normal appearance [Well Groomed] : well groomed [General Appearance - Well Nourished] : well nourished [No Deformities] : no deformities [General Appearance - In No Acute Distress] : no acute distress [Normal Conjunctiva] : the conjunctiva exhibited no abnormalities [Eyelids - No Xanthelasma] : the eyelids demonstrated no xanthelasmas [Normal Oral Mucosa] : normal oral mucosa [No Oral Pallor] : no oral pallor [No Oral Cyanosis] : no oral cyanosis [JVD Elevated _____cm] : JVD elevated [unfilled] ~U cm above clavicle [Respiration, Rhythm And Depth] : normal respiratory rhythm and effort [Exaggerated Use Of Accessory Muscles For Inspiration] : no accessory muscle use [Auscultation Breath Sounds / Voice Sounds] : lungs were clear to auscultation bilaterally [Heart Rate And Rhythm] : heart rate and rhythm were normal [Heart Sounds] : normal S1 and S2 [Murmurs] : no murmurs present [Arterial Pulses Normal] : the arterial pulses were normal [Edema] : no peripheral edema present [Veins - Varicosity Changes] : no varicosital changes were noted in the lower extremities [Bowel Sounds] : normal bowel sounds [Abdomen Soft] : soft [Abnormal Walk] : normal gait [FreeTextEntry1] : No kyphosis [Nail Clubbing] : no clubbing of the fingernails [Cyanosis, Localized] : no localized cyanosis [Petechial Hemorrhages (___cm)] : no petechial hemorrhages [] : no ischemic changes [Skin Color & Pigmentation] : normal skin color and pigmentation [Skin Turgor] : normal skin turgor [Oriented To Time, Place, And Person] : oriented to person, place, and time [Affect] : the affect was normal [Mood] : the mood was normal [Memory Recent] : recent memory was not impaired [Memory Remote] : remote memory was not impaired [No Anxiety] : not feeling anxious

## 2019-10-10 ENCOUNTER — MEDICATION RENEWAL (OUTPATIENT)
Age: 64
End: 2019-10-10

## 2019-11-21 ENCOUNTER — MEDICATION RENEWAL (OUTPATIENT)
Age: 64
End: 2019-11-21

## 2019-11-21 RX ORDER — LOSARTAN POTASSIUM AND HYDROCHLOROTHIAZIDE 25; 100 MG/1; MG/1
100-25 TABLET ORAL DAILY
Qty: 90 | Refills: 3 | Status: DISCONTINUED | COMMUNITY
End: 2019-11-21

## 2020-03-13 ENCOUNTER — APPOINTMENT (OUTPATIENT)
Dept: CARDIOLOGY | Facility: CLINIC | Age: 65
End: 2020-03-13
Payer: COMMERCIAL

## 2020-03-13 ENCOUNTER — NON-APPOINTMENT (OUTPATIENT)
Age: 65
End: 2020-03-13

## 2020-03-13 VITALS
SYSTOLIC BLOOD PRESSURE: 118 MMHG | OXYGEN SATURATION: 97 % | DIASTOLIC BLOOD PRESSURE: 62 MMHG | HEIGHT: 73 IN | HEART RATE: 75 BPM | BODY MASS INDEX: 22.66 KG/M2 | WEIGHT: 171 LBS

## 2020-03-13 PROCEDURE — 99214 OFFICE O/P EST MOD 30 MIN: CPT

## 2020-03-13 PROCEDURE — 93000 ELECTROCARDIOGRAM COMPLETE: CPT

## 2020-03-13 RX ORDER — PREDNISONE 1 MG/1
1 TABLET ORAL DAILY
Refills: 0 | Status: DISCONTINUED | COMMUNITY
End: 2020-03-13

## 2020-03-13 RX ORDER — ASPIRIN ENTERIC COATED TABLETS 81 MG 81 MG/1
81 TABLET, DELAYED RELEASE ORAL DAILY
Qty: 30 | Refills: 0 | Status: ACTIVE | COMMUNITY
Start: 2020-03-13

## 2020-03-13 RX ORDER — PREDNISONE 5 MG/1
5 TABLET ORAL DAILY
Refills: 0 | Status: DISCONTINUED | COMMUNITY
End: 2020-03-13

## 2020-03-13 RX ORDER — ROSUVASTATIN CALCIUM 10 MG/1
10 TABLET, FILM COATED ORAL
Refills: 0 | Status: DISCONTINUED | COMMUNITY
End: 2020-03-13

## 2020-03-13 NOTE — HISTORY OF PRESENT ILLNESS
[FreeTextEntry1] : HPI:\par \par Atrial fibrillation.  As above.  No recurrent symptoms. cryoablation in March 2018. He was taken off Xarelto and off diltiazem by Dr. Rodrigues\par Dyslipidemia.  Never did restart rosuvastatin.  He has gradually weaned off the prednisone taking for his polymyalgia.  He has been off steroids altogether for several weeks with no myalgias.\par Mild mitral regurgitation

## 2020-03-13 NOTE — REASON FOR VISIT
[Follow-Up - Clinic] : a clinic follow-up of [Atrial Fibrillation] : atrial fibrillation [Hyperlipidemia] : hyperlipidemia [Hypertension] : hypertension [FreeTextEntry1] : 64-year-old gentleman is seen in the office for follow-up consultation\par Since last seen I reviewed his labs\par He has no chest pain shortness of breath palpitation lightheaded dizziness\par He has no nausea vomiting hemoptysis hematemesis melena or dark loose stool\par He has no claudication pain \par He has lost weight\par He has no recent hospital admission

## 2020-03-13 NOTE — PHYSICAL EXAM
[General Appearance - Well Developed] : well developed [Normal Appearance] : normal appearance [Well Groomed] : well groomed [General Appearance - Well Nourished] : well nourished [No Deformities] : no deformities [General Appearance - In No Acute Distress] : no acute distress [Normal Conjunctiva] : the conjunctiva exhibited no abnormalities [Eyelids - No Xanthelasma] : the eyelids demonstrated no xanthelasmas [Normal Oral Mucosa] : normal oral mucosa [No Oral Pallor] : no oral pallor [No Oral Cyanosis] : no oral cyanosis [JVD Elevated _____cm] : JVD elevated [unfilled] ~U cm above clavicle [Respiration, Rhythm And Depth] : normal respiratory rhythm and effort [Exaggerated Use Of Accessory Muscles For Inspiration] : no accessory muscle use [Auscultation Breath Sounds / Voice Sounds] : lungs were clear to auscultation bilaterally [Heart Rate And Rhythm] : heart rate and rhythm were normal [Heart Sounds] : normal S1 and S2 [Murmurs] : no murmurs present [Arterial Pulses Normal] : the arterial pulses were normal [Edema] : no peripheral edema present [Veins - Varicosity Changes] : no varicosital changes were noted in the lower extremities [Bowel Sounds] : normal bowel sounds [Abdomen Soft] : soft [Abnormal Walk] : normal gait [Nail Clubbing] : no clubbing of the fingernails [Cyanosis, Localized] : no localized cyanosis [Petechial Hemorrhages (___cm)] : no petechial hemorrhages [] : no ischemic changes [Skin Color & Pigmentation] : normal skin color and pigmentation [Skin Turgor] : normal skin turgor [Oriented To Time, Place, And Person] : oriented to person, place, and time [Affect] : the affect was normal [Mood] : the mood was normal [Memory Recent] : recent memory was not impaired [Memory Remote] : remote memory was not impaired [No Anxiety] : not feeling anxious [FreeTextEntry1] : No kyphosis

## 2020-03-13 NOTE — ASSESSMENT
[FreeTextEntry1] : ASSESSMENT AND RECOMMENDATIONS:\par This is a 64-year-old white male with past medical history as detailed above, seen today, with the following active issues:\par Paroxysmal atrial fibrillation/aflutter, status post cryoablation.  No symptomatic recurrence.  We discussed potential recurrence and instructions to contact us for symptoms.  He has been taken off diltiazem and Xarelto by Dr. Rodrigues.  Aspirin 81 mg in presence of multiple cardiovascular risk factors.\par Hypertension.   No CHF, no renal insufficiency. Nonsmoker. very well controlled\par Dyslipidemia.  He has been taking rosuvastatin at 5 mg.  Increase to 10 mg.  Follow-up labs.\par Mitral insufficiency.  History of paroxysmal atrial fibrillation and hypertension.  Echocardiogram will be done to evaluate LV ejection fraction left atrial size severity of mitral regurgitation.\par \par Counseling regarding low saturated fat, salt and carbohydrate intake was reviewed. Active lifestyle and regular. Exercise along with weight management is advised.\par All the above were at length reviewed. Answered all the questions. Thank you very much for this kind referral. Please do not hesitate to give me a call for any question.\par Part of this transcription was done with voice recognition software and phonetically similar errors are common. I apologize for that. Please donot hesitate to call for any questions due to above.\par Follow up in one year or for any change\par

## 2020-03-17 ENCOUNTER — APPOINTMENT (OUTPATIENT)
Dept: CARDIOLOGY | Facility: CLINIC | Age: 65
End: 2020-03-17
Payer: COMMERCIAL

## 2020-03-17 PROCEDURE — 93306 TTE W/DOPPLER COMPLETE: CPT

## 2020-12-01 ENCOUNTER — APPOINTMENT (OUTPATIENT)
Dept: CARDIOLOGY | Facility: CLINIC | Age: 65
End: 2020-12-01
Payer: COMMERCIAL

## 2020-12-01 ENCOUNTER — NON-APPOINTMENT (OUTPATIENT)
Age: 65
End: 2020-12-01

## 2020-12-01 VITALS
OXYGEN SATURATION: 96 % | SYSTOLIC BLOOD PRESSURE: 128 MMHG | HEART RATE: 85 BPM | BODY MASS INDEX: 24.39 KG/M2 | WEIGHT: 184 LBS | DIASTOLIC BLOOD PRESSURE: 74 MMHG | HEIGHT: 73 IN

## 2020-12-01 PROCEDURE — 93000 ELECTROCARDIOGRAM COMPLETE: CPT

## 2020-12-01 PROCEDURE — 99072 ADDL SUPL MATRL&STAF TM PHE: CPT

## 2020-12-01 PROCEDURE — 99214 OFFICE O/P EST MOD 30 MIN: CPT

## 2020-12-01 NOTE — HISTORY OF PRESENT ILLNESS
[FreeTextEntry1] : HIMANSHU MATA  is a 65 year M  who presents today Dec 01, 2020 requesting to be seen for lower extremity swelling. Swelling has been occurring daily for approx 1 week. Swelling is resolved in morning upon waking up and progressively worsens as the day goes on. There is no calf tenderness. No skin changes. Slight weight gain with the pandemic. Increased salt intake with Thanksgiving. \par \par Today he denies chest pain, pressure, unusual shortness of breath, lightheadedness, dizziness, near syncope or syncope. \par \par There is no prior history of myocardial infarction, coronary revascularization, history of ischemic heart disease, or symptomatic congestive heart failure. \par \par Atrial fibrillation.  As above.  No recurrent symptoms. cryoablation in March 2018. He was taken off Xarelto and off diltiazem by Dr. Rodrigues\par Dyslipidemia.  Never did restart rosuvastatin.  He has gradually weaned off the prednisone taking for his polymyalgia.  He has been off steroids altogether for several weeks with no myalgias.\par Mild mitral regurgitation

## 2020-12-01 NOTE — PHYSICAL EXAM
[General Appearance - Well Developed] : well developed [Normal Appearance] : normal appearance [Well Groomed] : well groomed [General Appearance - Well Nourished] : well nourished [No Deformities] : no deformities [General Appearance - In No Acute Distress] : no acute distress [Normal Conjunctiva] : the conjunctiva exhibited no abnormalities [Eyelids - No Xanthelasma] : the eyelids demonstrated no xanthelasmas [Normal Oral Mucosa] : normal oral mucosa [No Oral Pallor] : no oral pallor [No Oral Cyanosis] : no oral cyanosis [JVD Elevated _____cm] : JVD elevated [unfilled] ~U cm above clavicle [Respiration, Rhythm And Depth] : normal respiratory rhythm and effort [Exaggerated Use Of Accessory Muscles For Inspiration] : no accessory muscle use [Auscultation Breath Sounds / Voice Sounds] : lungs were clear to auscultation bilaterally [Heart Rate And Rhythm] : heart rate and rhythm were normal [Heart Sounds] : normal S1 and S2 [Murmurs] : no murmurs present [Arterial Pulses Normal] : the arterial pulses were normal [Veins - Varicosity Changes] : no varicosital changes were noted in the lower extremities [Bowel Sounds] : normal bowel sounds [Abdomen Soft] : soft [Abnormal Walk] : normal gait [FreeTextEntry1] : No kyphosis [Nail Clubbing] : no clubbing of the fingernails [Cyanosis, Localized] : no localized cyanosis [Petechial Hemorrhages (___cm)] : no petechial hemorrhages [] : no ischemic changes [Skin Color & Pigmentation] : normal skin color and pigmentation [Skin Turgor] : normal skin turgor [Oriented To Time, Place, And Person] : oriented to person, place, and time [Affect] : the affect was normal [Mood] : the mood was normal [Memory Recent] : recent memory was not impaired [Memory Remote] : remote memory was not impaired [No Anxiety] : not feeling anxious

## 2020-12-01 NOTE — ASSESSMENT
[FreeTextEntry1] : HIMANSHU MATA  is a 65 year M  who presents today Dec 01, 2020 with the above history and the following active issues. \par \par Lower extremity swelling. Recommend follow-up blood work including CBC, CMP, BNP. Low sodium diet. Reduce Amlodipine to 5mg QD. Monitor BP at home and bring monitor to next OV for assessment of accuracy. \par Close clinical follow-up. \par \par Paroxysmal atrial fibrillation/aflutter, status post cryoablation.  No symptomatic recurrence.  We discussed potential recurrence and instructions to contact us for symptoms.  He has been taken off diltiazem and Xarelto by Dr. Rodrigues.  Aspirin 81 mg in presence of multiple cardiovascular risk factors. EKG today demonstrates NSR.\par \par Hypertension.   No CHF, no renal insufficiency. Nonsmoker. very well controlled\par \par Dyslipidemia.  He has been taking rosuvastatin at 5 mg.  Increase to 10 mg.  Follow-up labs.\par Mitral insufficiency.  History of paroxysmal atrial fibrillation and hypertension.  Echocardiogram will be done to evaluate LV ejection fraction left atrial size severity of mitral regurgitation.\par \par Red flag symptoms which would warrant sooner emergent evaluation reviewed with the patient. \par Questions and concerns were addressed and answered. \par \par Clinical follow-up in 2 weeks\par \par Sincerely,\par \par Jeni Pool PA-C\par Patients history, testing and plan reviewed with supervising MD: Dr. Kellen Caraballo

## 2020-12-29 ENCOUNTER — APPOINTMENT (OUTPATIENT)
Dept: CARDIOLOGY | Facility: CLINIC | Age: 65
End: 2020-12-29
Payer: COMMERCIAL

## 2020-12-29 VITALS
WEIGHT: 180 LBS | HEART RATE: 79 BPM | OXYGEN SATURATION: 95 % | SYSTOLIC BLOOD PRESSURE: 138 MMHG | DIASTOLIC BLOOD PRESSURE: 80 MMHG | BODY MASS INDEX: 23.86 KG/M2 | TEMPERATURE: 96.8 F | HEIGHT: 73 IN

## 2020-12-29 PROCEDURE — 99214 OFFICE O/P EST MOD 30 MIN: CPT

## 2020-12-29 PROCEDURE — 99072 ADDL SUPL MATRL&STAF TM PHE: CPT

## 2020-12-29 NOTE — ASSESSMENT
[FreeTextEntry1] : HIMANSHU MATA  is a 65 year M  who presents today Dec 01, 2020 with the above history and the following active issues. \par \par Lower extremity swelling. Recommend follow-up blood work including CBC, CMP, BNP. Low sodium diet. Reduce Amlodipine to 5mg QD. Monitor BP at home and bring monitor to next OV for assessment of accuracy. \par Close clinical follow-up. \par \par Paroxysmal atrial fibrillation/aflutter, status post cryoablation.  No symptomatic recurrence.  We discussed potential recurrence and instructions to contact us for symptoms.  He has been taken off diltiazem and Xarelto by Dr. Rodrigues.  Aspirin 81 mg in presence of multiple cardiovascular risk factors. EKG today demonstrates NSR.\par \par Hypertension.   No CHF, no renal insufficiency. Nonsmoker. very well controlled\par \par Dyslipidemia.  He has been taking rosuvastatin at 5 mg.  Increase to 10 mg.  Follow-up labs.\par Mitral insufficiency.  History of paroxysmal atrial fibrillation and hypertension.  Echocardiogram will be done to evaluate LV ejection fraction left atrial size severity of mitral regurgitation.\par \par \par \par Clinical follow-up in 2 weeks\par \par Sincerely,\par \par Jeni Pool PA-C\par Patients history, testing and plan reviewed with supervising MD: Dr. Kellen Caraballo

## 2020-12-29 NOTE — PHYSICAL EXAM
[General Appearance - Well Developed] : well developed [Normal Appearance] : normal appearance [Well Groomed] : well groomed [General Appearance - Well Nourished] : well nourished [No Deformities] : no deformities [General Appearance - In No Acute Distress] : no acute distress [Normal Conjunctiva] : the conjunctiva exhibited no abnormalities [Eyelids - No Xanthelasma] : the eyelids demonstrated no xanthelasmas [Normal Oral Mucosa] : normal oral mucosa [No Oral Pallor] : no oral pallor [No Oral Cyanosis] : no oral cyanosis [JVD Elevated _____cm] : JVD elevated [unfilled] ~U cm above clavicle [Respiration, Rhythm And Depth] : normal respiratory rhythm and effort [Exaggerated Use Of Accessory Muscles For Inspiration] : no accessory muscle use [Auscultation Breath Sounds / Voice Sounds] : lungs were clear to auscultation bilaterally [Heart Rate And Rhythm] : heart rate and rhythm were normal [Heart Sounds] : normal S1 and S2 [Murmurs] : no murmurs present [Arterial Pulses Normal] : the arterial pulses were normal [Veins - Varicosity Changes] : no varicosital changes were noted in the lower extremities [Bowel Sounds] : normal bowel sounds [Abdomen Soft] : soft [Abnormal Walk] : normal gait [Nail Clubbing] : no clubbing of the fingernails [Cyanosis, Localized] : no localized cyanosis [Petechial Hemorrhages (___cm)] : no petechial hemorrhages [] : no ischemic changes [Skin Color & Pigmentation] : normal skin color and pigmentation [Skin Turgor] : normal skin turgor [Oriented To Time, Place, And Person] : oriented to person, place, and time [Affect] : the affect was normal [Mood] : the mood was normal [Memory Recent] : recent memory was not impaired [Memory Remote] : remote memory was not impaired [No Anxiety] : not feeling anxious [FreeTextEntry1] : No kyphosis

## 2020-12-31 ENCOUNTER — APPOINTMENT (OUTPATIENT)
Dept: CARDIOLOGY | Facility: CLINIC | Age: 65
End: 2020-12-31

## 2021-01-26 ENCOUNTER — APPOINTMENT (OUTPATIENT)
Dept: CARDIOLOGY | Facility: CLINIC | Age: 66
End: 2021-01-26
Payer: COMMERCIAL

## 2021-01-26 VITALS
BODY MASS INDEX: 23.46 KG/M2 | DIASTOLIC BLOOD PRESSURE: 70 MMHG | OXYGEN SATURATION: 98 % | WEIGHT: 177 LBS | SYSTOLIC BLOOD PRESSURE: 120 MMHG | HEIGHT: 73 IN | HEART RATE: 79 BPM

## 2021-01-26 PROCEDURE — 99214 OFFICE O/P EST MOD 30 MIN: CPT

## 2021-01-26 PROCEDURE — 99072 ADDL SUPL MATRL&STAF TM PHE: CPT

## 2021-01-26 NOTE — ASSESSMENT
[FreeTextEntry1] : HIMANSHU MATA  is a 65 year M  who presents today Jan 26, 2021 with the above history and the following active issues. \par \par History of lower extremity swelling on Amlodipine 10mg which has reolved with reduction of dose to Amlodipine to 5mg QD. Blood pressure well controlled on my assessment. Continue to take current dose. If there is recurrence of swelling he is aware to call the office. \par \par Paroxysmal atrial fibrillation/aflutter, status post cryoablation.  No symptomatic recurrence.  We discussed potential recurrence and instructions to contact us for symptoms.  He has been taken off diltiazem and Xarelto by Dr. Rodrigues.  Aspirin 81 mg in presence of multiple cardiovascular risk factors. EKG today demonstrates NSR.\par \par Hypertension.   No CHF, no renal insufficiency. Nonsmoker. Very well controlled\par \par Dyslipidemia.  He has been taking rosuvastatin 10 mg. Tolerating without adverse symptoms.\par Lifestyle and risk factor modification.\par \par Minimal mitral valve regurgitation by echo 3/2020. Continue to monitor. \par Does not require SBE prophylaxis. \par \par Red flag symptoms which would warrant sooner emergent evaluation reviewed with the patient. \par Questions and concerns were addressed and answered. \par \par Clinical follow-up in 6 months unless symptoms warrant sooner emergent evaluation.\par \par Sincerely,\par \par Jeni Pool PA-C\par Patients history, testing and plan reviewed with supervising MD: Dr. Patrick Valentino

## 2021-01-26 NOTE — HISTORY OF PRESENT ILLNESS
[FreeTextEntry1] : HIMANSHU MATA  is a 65 year M  who presents today Jan 26, 2021 in clinical follow-up and for reassessment of blood pressure on lower dose of Amlodipine. There was lower extremity swelling with taking Amlodipine 10mg QD. On reduced dose of 5mg QD symptoms of swelling have fully resolved and he is feeling well. Blood pressure well controlled on my assessment. \par Exercising on a regular basis with no new exertional complaints. \par \par Today he denies chest pain, pressure, unusual shortness of breath, lightheadedness, dizziness, near syncope or syncope. \par \par There is no prior history of myocardial infarction, coronary revascularization, history of ischemic heart disease, or symptomatic congestive heart failure. \par \par Atrial fibrillation.  As above.  No recurrent symptoms. cryoablation in March 2018. He was taken off Xarelto and off diltiazem by Dr. Rodrigues\par Dyslipidemia.  Never did restart rosuvastatin.  He has gradually weaned off the prednisone taking for his polymyalgia.  He has been off steroids altogether for several weeks with no myalgias.\par Mild mitral regurgitation

## 2021-01-26 NOTE — PHYSICAL EXAM
[General Appearance - Well Developed] : well developed [Normal Appearance] : normal appearance [Well Groomed] : well groomed [General Appearance - Well Nourished] : well nourished [No Deformities] : no deformities [General Appearance - In No Acute Distress] : no acute distress [Normal Conjunctiva] : the conjunctiva exhibited no abnormalities [Eyelids - No Xanthelasma] : the eyelids demonstrated no xanthelasmas [Normal Oral Mucosa] : normal oral mucosa [No Oral Pallor] : no oral pallor [No Oral Cyanosis] : no oral cyanosis [JVD Elevated _____cm] : JVD elevated [unfilled] ~U cm above clavicle [Respiration, Rhythm And Depth] : normal respiratory rhythm and effort [Exaggerated Use Of Accessory Muscles For Inspiration] : no accessory muscle use [Auscultation Breath Sounds / Voice Sounds] : lungs were clear to auscultation bilaterally [Heart Rate And Rhythm] : heart rate and rhythm were normal [Heart Sounds] : normal S1 and S2 [Murmurs] : no murmurs present [Arterial Pulses Normal] : the arterial pulses were normal [Veins - Varicosity Changes] : no varicosital changes were noted in the lower extremities [Bowel Sounds] : normal bowel sounds [Abdomen Soft] : soft [Abnormal Walk] : normal gait [FreeTextEntry1] : No kyphosis [Nail Clubbing] : no clubbing of the fingernails [Cyanosis, Localized] : no localized cyanosis [Petechial Hemorrhages (___cm)] : no petechial hemorrhages [] : no ischemic changes [Skin Color & Pigmentation] : normal skin color and pigmentation [Skin Turgor] : normal skin turgor [Affect] : the affect was normal [Oriented To Time, Place, And Person] : oriented to person, place, and time [Mood] : the mood was normal [Memory Recent] : recent memory was not impaired [No Anxiety] : not feeling anxious [Memory Remote] : remote memory was not impaired

## 2021-09-01 ENCOUNTER — RX RENEWAL (OUTPATIENT)
Age: 66
End: 2021-09-01

## 2021-09-15 ENCOUNTER — APPOINTMENT (OUTPATIENT)
Dept: CARDIOLOGY | Facility: CLINIC | Age: 66
End: 2021-09-15
Payer: COMMERCIAL

## 2021-09-15 VITALS
WEIGHT: 186 LBS | HEART RATE: 77 BPM | BODY MASS INDEX: 24.65 KG/M2 | HEIGHT: 73 IN | OXYGEN SATURATION: 98 % | SYSTOLIC BLOOD PRESSURE: 132 MMHG | DIASTOLIC BLOOD PRESSURE: 64 MMHG

## 2021-09-15 PROCEDURE — 99214 OFFICE O/P EST MOD 30 MIN: CPT

## 2021-09-15 RX ORDER — HYDROCHLOROTHIAZIDE 25 MG/1
25 TABLET ORAL DAILY
Qty: 90 | Refills: 1 | Status: DISCONTINUED | COMMUNITY
End: 2021-09-15

## 2021-09-15 RX ORDER — LOSARTAN POTASSIUM 100 MG/1
100 TABLET, FILM COATED ORAL
Qty: 90 | Refills: 1 | Status: DISCONTINUED | COMMUNITY
End: 2021-09-15

## 2021-09-15 NOTE — REVIEW OF SYSTEMS
[SOB] : shortness of breath [Dyspnea on exertion] : dyspnea during exertion [Lower Ext Edema] : lower extremity edema [Joint Pain] : joint pain [Joint Stiffness] : joint stiffness [Negative] : Heme/Lymph

## 2021-09-15 NOTE — ASSESSMENT
[FreeTextEntry1] : ASSESSMENT AND RECOMMENDATIONS:\par This is a 66-year-old white male with past medical history as detailed above, seen today, with the following active issues:\par Paroxysmal atrial fibrillation/aflutter, status post cryoablation.  No symptomatic recurrence.  We discussed potential recurrence and instructions to contact us for symptoms.  He has been taken off diltiazem and Xarelto by Dr. Rodrigues.  Aspirin 81 mg in presence of multiple cardiovascular risk factors.\par Hypertension.   No CHF, no renal insufficiency. Nonsmoker.  Mildly uncontrolled.  Continue lifestyle modifications.  Increase amlodipine to 10 mg again.  If increasing edema.  May need to consider decrease dose of amlodipine but then addition of possible spironolactone.\par Vascular evaluation recommended for venous insufficiency related edema\par Consider compression stockings\par Dyslipidemia.  Continue rosuvastatin 10 mg.  Labs ordered.\par Mitral insufficiency.  Aortic regurgitation.  Borderline pulmonary artery systolic pressure.  PFO.  Edema.  Recommended echocardiogram to be repeated to evaluate any further progression of his valvular abnormality and pulmonary artery systolic pressure.\par \par Counseling regarding low saturated fat, salt and carbohydrate intake was reviewed. Active lifestyle and regular. Exercise along with weight management is advised.\par All the above were at length reviewed. Answered all the questions. Thank you very much for this kind referral. Please do not hesitate to give me a call for any question.\par Part of this transcription was done with voice recognition software and phonetically similar errors are common. I apologize for that. Please donot hesitate to call for any questions due to above.\par \par Based on above evaluation will discuss further regarding long-term management.\par Follow-up in 2 months.\par

## 2021-09-15 NOTE — REASON FOR VISIT
[Arrhythmia/ECG Abnorrmalities] : arrhythmia/ECG abnormalities [Hyperlipidemia] : hyperlipidemia [Hypertension] : hypertension [FreeTextEntry3] : Dr. Rosado [FreeTextEntry1] : 66-year-old gentleman comes in for follow-up consultation.  Since last seen he had significant bout of polymyalgia rheumatica on prednisone tapering dose.  He has noticed less ankle swelling since decreasing amlodipine though his blood pressure has increased.  And many times remaining greater than 140/90.  He has lost weight with less salt and carbohydrate intake.  He has good exercise program without any symptoms.\par He has no chest pain shortness of breath palpitation lightheaded dizziness\par He has no nausea vomiting hemoptysis hematemesis melena or dark loose stool\par He has no claudication pain \par He has lost weight\par He has no recent hospital admission

## 2021-10-04 ENCOUNTER — APPOINTMENT (OUTPATIENT)
Dept: CARDIOLOGY | Facility: CLINIC | Age: 66
End: 2021-10-04
Payer: COMMERCIAL

## 2021-10-04 PROCEDURE — 93306 TTE W/DOPPLER COMPLETE: CPT

## 2021-10-14 ENCOUNTER — NON-APPOINTMENT (OUTPATIENT)
Age: 66
End: 2021-10-14

## 2021-11-19 ENCOUNTER — NON-APPOINTMENT (OUTPATIENT)
Age: 66
End: 2021-11-19

## 2021-11-19 ENCOUNTER — APPOINTMENT (OUTPATIENT)
Dept: CARDIOLOGY | Facility: CLINIC | Age: 66
End: 2021-11-19
Payer: COMMERCIAL

## 2021-11-19 VITALS
DIASTOLIC BLOOD PRESSURE: 70 MMHG | WEIGHT: 181 LBS | SYSTOLIC BLOOD PRESSURE: 128 MMHG | HEIGHT: 73 IN | HEART RATE: 78 BPM | BODY MASS INDEX: 23.99 KG/M2 | OXYGEN SATURATION: 95 %

## 2021-11-19 DIAGNOSIS — M79.89 OTHER SPECIFIED SOFT TISSUE DISORDERS: ICD-10-CM

## 2021-11-19 PROCEDURE — 93000 ELECTROCARDIOGRAM COMPLETE: CPT

## 2021-11-19 PROCEDURE — 99214 OFFICE O/P EST MOD 30 MIN: CPT

## 2021-11-19 RX ORDER — AMLODIPINE BESYLATE 10 MG/1
10 TABLET ORAL DAILY
Qty: 90 | Refills: 3 | Status: DISCONTINUED | COMMUNITY
Start: 2018-09-28 | End: 2021-11-19

## 2021-11-19 RX ORDER — PREDNISONE 1 MG/1
1 TABLET ORAL DAILY
Refills: 0 | Status: DISCONTINUED | COMMUNITY
End: 2021-11-19

## 2021-11-19 NOTE — REASON FOR VISIT
[Arrhythmia/ECG Abnorrmalities] : arrhythmia/ECG abnormalities [Hyperlipidemia] : hyperlipidemia [Hypertension] : hypertension [FreeTextEntry3] : Dr. Rosado [FreeTextEntry1] : 66 gentleman comes in for follow-up consultation.  Reviewed labs, echocardiogram, venous Doppler study and EKG.\par Since last seen he has seen vascular surgery.  No significant findings were noted on venous Doppler study.  He is wearing compression stockings with improved edema.\par He also decrease his amlodipine down to 5 mg for worsening of the edema.\par He is being evaluated by rheumatologist for worsening of PMR.  And remains on low-dose of prednisone.\par He has stable dietary restrictions.\par He tries to remain active.\par He has no chest pain shortness of breath palpitation lightheaded dizziness\par He has no nausea vomiting hemoptysis hematemesis melena or dark loose stool\par He has no claudication pain \par He has lost weight\par He has no recent hospital admission

## 2021-11-19 NOTE — CARDIOLOGY SUMMARY
[de-identified] : October 4, 2021 LV ejection fraction 60 to 65% mild mitral and aortic insufficiency ascending aorta 3.9 cm.  RVSP 23.  Possible PFO/small ASD.  Normal chamber dimensions. [de-identified] : Peripheral venous Doppler study October 1, 2021.  Bilateral no evidence of DVT and competent valves without significant reflux.  Baker's cyst.

## 2021-11-19 NOTE — ASSESSMENT
[FreeTextEntry1] : Reviewed on November 19, 2021.\par Echocardiogram/venous Doppler study report as noted above\par EKG November 19, 2021 normal sinus rhythm nonspecific T wave poor baseline.  Labs from October 13, 2021.\par Stable CBC sodium 143 potassium 4.1 creatinine 0.83 LFT normal LDL 98 HDL 55 triglycerides 54 total cholesterol 164 N-terminal proBNP 73.5\par

## 2021-11-19 NOTE — DISCUSSION/SUMMARY
[FreeTextEntry1] : SSESSMENT AND RECOMMENDATIONS:\par This is a 66-year-old white male with past medical history as detailed above, seen today, with the following active issues:\par #1 paroxysmal atrial fibrillation/aflutter, status post cryoablation.  No symptomatic recurrence.  We discussed potential recurrence and instructions to contact us for symptoms.  He has been taken off diltiazem and Xarelto by Dr. Rodrigues.  Aspirin 81 mg in presence of multiple cardiovascular risk factors.\par #2 hypertension.   No CHF, no renal insufficiency. Nonsmoker.  Stable.  Continue lifestyle modifications.  Considering amlodipine needed to be decreased to 5 mg.  And with history of atrial fibrillation bisoprolol 5 mg added to the present regimen.  Risk benefits alternatives and side effects were reviewed.\par He will contact us for any change in his symptoms goal less than 130/80\par 3.  Edema.  Venous insufficiency evaluation was negative.  He has PMR.  And on amlodipine.  Recommended low-salt diet.  Compression stockings.  Continue lower dose of amlodipine.\par #4 dyslipidemia.  Continue rosuvastatin 10 mg.  Stable labs reviewed goals discussed\par #5 mitral insufficiency.  Aortic regurgitation.  Borderline pulmonary artery systolic pressure.  PFO.  No new changes noted.  Continue aspirin.  Continue blood pressure heart rate control.  Follow-up.\par \par Counseling regarding low saturated fat, salt and carbohydrate intake was reviewed. Active lifestyle and regular. Exercise along with weight management is advised.\par All the above were at length reviewed. Answered all the questions. Thank you very much for this kind referral. Please do not hesitate to give me a call for any question.\par Part of this transcription was done with voice recognition software and phonetically similar errors are common. I apologize for that. Please donot hesitate to call for any questions due to above.\par \par Follow-up in 3 months or for any change in his symptoms\par

## 2021-11-29 ENCOUNTER — RX RENEWAL (OUTPATIENT)
Age: 66
End: 2021-11-29

## 2022-02-15 NOTE — H&P PST ADULT - FAMILY HISTORY
"Pharmacy calling again - \"they have not received a response since Thursday.\" Looks like encounter was closed so I'm not sure what happened there.    AE please review/advise.  "
Left a VM for the patient to call the office back and let us know if she was able to schedule with Dr. Armani Hernandez or not.
[facility] calling, they need more specific instructions on patients Zofran/Flexeril. They need to know how often and max per day  
Updated.  Rosetta Nicole MD    
No pertinent family history in first degree relatives

## 2022-03-08 ENCOUNTER — APPOINTMENT (OUTPATIENT)
Dept: CARDIOLOGY | Facility: CLINIC | Age: 67
End: 2022-03-08
Payer: COMMERCIAL

## 2022-03-08 VITALS
OXYGEN SATURATION: 96 % | BODY MASS INDEX: 24.92 KG/M2 | SYSTOLIC BLOOD PRESSURE: 134 MMHG | WEIGHT: 188 LBS | HEIGHT: 73 IN | DIASTOLIC BLOOD PRESSURE: 74 MMHG | HEART RATE: 74 BPM

## 2022-03-08 PROCEDURE — 99214 OFFICE O/P EST MOD 30 MIN: CPT

## 2022-03-08 RX ORDER — BISOPROLOL FUMARATE 5 MG/1
5 TABLET, FILM COATED ORAL DAILY
Qty: 90 | Refills: 3 | Status: DISCONTINUED | COMMUNITY
Start: 2021-11-19 | End: 2022-03-08

## 2022-03-08 RX ORDER — BISOPROLOL FUMARATE 5 MG/1
5 TABLET, FILM COATED ORAL DAILY
Refills: 0 | Status: DISCONTINUED | COMMUNITY
End: 2022-03-08

## 2022-03-08 RX ORDER — PREDNISONE 5 MG/1
5 TABLET ORAL DAILY
Refills: 0 | Status: DISCONTINUED | COMMUNITY
End: 2022-03-08

## 2022-03-08 NOTE — CARDIOLOGY SUMMARY
[de-identified] : October 4, 2021 LV ejection fraction 60 to 65% mild mitral and aortic insufficiency ascending aorta 3.9 cm.  RVSP 23.  Possible PFO/small ASD.  Normal chamber dimensions. [de-identified] : Peripheral venous Doppler study October 1, 2021.  Bilateral no evidence of DVT and competent valves without significant reflux.  Baker's cyst.

## 2022-03-08 NOTE — PHYSICAL EXAM
[General Appearance - Well Developed] : well developed [Normal Appearance] : normal appearance [Well Groomed] : well groomed [General Appearance - Well Nourished] : well nourished [No Deformities] : no deformities [General Appearance - In No Acute Distress] : no acute distress [JVD Elevated _____cm] : JVD elevated [unfilled] ~U cm above clavicle [Respiration, Rhythm And Depth] : normal respiratory rhythm and effort [Exaggerated Use Of Accessory Muscles For Inspiration] : no accessory muscle use [Auscultation Breath Sounds / Voice Sounds] : lungs were clear to auscultation bilaterally [Heart Rate And Rhythm] : heart rate and rhythm were normal [Heart Sounds] : normal S1 and S2 [Murmurs] : no murmurs present [Arterial Pulses Normal] : the arterial pulses were normal [Edema] : no peripheral edema present [Veins - Varicosity Changes] : no varicosital changes were noted in the lower extremities [Abnormal Walk] : normal gait [Nail Clubbing] : no clubbing of the fingernails [Cyanosis, Localized] : no localized cyanosis [Petechial Hemorrhages (___cm)] : no petechial hemorrhages [] : no ischemic changes [FreeTextEntry1] : No kyphosis

## 2022-03-08 NOTE — REVIEW OF SYSTEMS
[SOB] : shortness of breath [Dyspnea on exertion] : dyspnea during exertion [Lower Ext Edema] : lower extremity edema [Joint Pain] : joint pain [Joint Stiffness] : joint stiffness [Negative] : Heme/Lymph [Feeling Fatigued] : feeling fatigued [FreeTextEntry2] : See HPI

## 2022-03-08 NOTE — DISCUSSION/SUMMARY
[FreeTextEntry1] : SSESSMENT AND RECOMMENDATIONS:\par This is a 66-year-old white male with past medical history as detailed above, seen today, with the following active issues:\par #1 paroxysmal atrial fibrillation/aflutter, status post cryoablation.  No symptomatic recurrence.  We discussed potential recurrence and instructions to contact us for symptoms.  He has been taken off diltiazem and Xarelto by Dr. Rodrigues.  Aspirin 81 mg in presence of multiple cardiovascular risk factors.\par #2 hypertension.   No CHF, no renal insufficiency. Nonsmoker.  Stable.  Continue lifestyle modifications.  Considering amlodipine needed to be decreased to 5 mg.  Metoprolol succinate ER 25 mg started in place of bisoprolol.  Risk benefits alternatives and side effects were reviewed.\par He will contact us for any change in his symptoms goal less than 130/80\par If continued intolerance to beta-blockers and blood pressure remains uncontrolled we can consider either increasing dose of amlodipine back to 10 mg or changing amlodipine to diltiazem long-acting.\par 3.  Edema.  Venous insufficiency evaluation was negative.  He has PMR.  And on amlodipine.  Recommended low-salt diet.  Compression stockings.  Continue lower dose of amlodipine.\par #4 dyslipidemia.  Continue rosuvastatin 10 mg.  Stable labs reviewed goals discussed\par #5 mitral insufficiency.  Aortic regurgitation.  Borderline pulmonary artery systolic pressure.  PFO.  No new changes noted.  Continue aspirin.  Continue blood pressure heart rate control.  Follow-up.\par \par Counseling regarding low saturated fat, salt and carbohydrate intake was reviewed. Active lifestyle and regular. Exercise along with weight management is advised.\par All the above were at length reviewed. Answered all the questions. Thank you very much for this kind referral. Please do not hesitate to give me a call for any question.\par Part of this transcription was done with voice recognition software and phonetically similar errors are common. I apologize for that. Please donot hesitate to call for any questions due to above.\par \par Follow-up in 6 months or for any change in his symptoms\par

## 2022-03-08 NOTE — REASON FOR VISIT
[Arrhythmia/ECG Abnorrmalities] : arrhythmia/ECG abnormalities [Hyperlipidemia] : hyperlipidemia [Hypertension] : hypertension [FreeTextEntry3] : Dr. Rosado [FreeTextEntry1] : 66 gentleman comes in for follow-up consultation.  On bisoprolol he had significantly increased fatigue and tiredness.  Which he stopped it after cutting it down to half dose.\par Since last seen he has seen vascular surgery.  No significant findings were noted on venous Doppler study.  He is wearing compression stockings with improved edema.\par He also decrease his amlodipine down to 5 mg for worsening of the edema.\par He is being evaluated by rheumatologist for worsening of PMR.  And remains on low-dose of prednisone.\par He has stable dietary restrictions.\par He tries to remain active.\par He has no chest pain shortness of breath palpitation lightheaded dizziness\par He has no nausea vomiting hemoptysis hematemesis melena or dark loose stool\par He has no claudication pain \par He has lost weight\par He has no recent hospital admission

## 2022-04-27 ENCOUNTER — RX RENEWAL (OUTPATIENT)
Age: 67
End: 2022-04-27

## 2022-09-07 ENCOUNTER — APPOINTMENT (OUTPATIENT)
Dept: CARDIOLOGY | Facility: CLINIC | Age: 67
End: 2022-09-07

## 2022-09-07 ENCOUNTER — NON-APPOINTMENT (OUTPATIENT)
Age: 67
End: 2022-09-07

## 2022-09-07 VITALS
WEIGHT: 191 LBS | HEART RATE: 68 BPM | OXYGEN SATURATION: 95 % | SYSTOLIC BLOOD PRESSURE: 138 MMHG | HEIGHT: 73 IN | BODY MASS INDEX: 25.31 KG/M2 | DIASTOLIC BLOOD PRESSURE: 72 MMHG

## 2022-09-07 PROCEDURE — 99214 OFFICE O/P EST MOD 30 MIN: CPT | Mod: 25

## 2022-09-07 PROCEDURE — 93000 ELECTROCARDIOGRAM COMPLETE: CPT

## 2022-09-07 RX ORDER — PREDNISONE 1 MG/1
1 TABLET ORAL DAILY
Refills: 0 | Status: DISCONTINUED | COMMUNITY
End: 2022-09-07

## 2022-09-07 NOTE — ASSESSMENT
[FreeTextEntry1] : Reviewed on November 19, 2021.\par Echocardiogram/venous Doppler study report as noted above\par EKG November 19, 2021 normal sinus rhythm nonspecific T wave poor baseline.  Labs from October 13, 2021.\par Stable CBC sodium 143 potassium 4.1 creatinine 0.83 LFT normal LDL 98 HDL 55 triglycerides 54 total cholesterol 164 N-terminal proBNP 73.5\par \par Reviewed on September 7, 2022.\par EKG showed normal sinus rhythm.\par Labs which were done recently showed stable CBC and creatinine.\par

## 2022-09-07 NOTE — DISCUSSION/SUMMARY
[FreeTextEntry1] : ASSESSMENT AND RECOMMENDATIONS:\par This is a 66-year-old white male with past medical history as detailed above, seen today, with the following active issues:\par #1 paroxysmal atrial fibrillation/aflutter, status post cryoablation.  No symptomatic recurrence.  We discussed potential recurrence and instructions to contact us for symptoms.  He has been taken off diltiazem and Xarelto by Dr. Rodrigues.  Aspirin 81 mg in presence of multiple cardiovascular risk factors.\par #2 hypertension.   No CHF, no renal insufficiency. Nonsmoker.  Mildly continue lifestyle modifications.  Increase amlodipine to 10 mg.  Continue ARB/hydrochlorothiazide.  And continue metoprolol low-dose.  If any side effects possibly symptomatic edema decrease amlodipine to 5 mg and change metoprolol to carvedilol or labetalol.  Risk benefits alternatives and side effects were reviewed.\par He will contact us for any change in his symptoms goal less than 130/80\par Low-salt diet lower alcohol intake strongly recommended.\par 3.  Edema.  Venous insufficiency evaluation was negative.  He has PMR.  And on amlodipine.  Recommended low-salt diet.  Compression stockings.  Continue lower dose of amlodipine.\par #4 dyslipidemia.  Continue rosuvastatin 10 mg.  Stable labs reviewed goals discussed\par #5 mitral insufficiency.  Aortic regurgitation.  Borderline pulmonary artery systolic pressure.  PFO.  No new changes noted.  Continue aspirin.  Continue blood pressure heart rate control. \par \par Counseling regarding low saturated fat, salt and carbohydrate intake was reviewed. Active lifestyle and regular. Exercise along with weight management is advised.\par All the above were at length reviewed. Answered all the questions. Thank you very much for this kind referral. Please do not hesitate to give me a call for any question.\par Part of this transcription was done with voice recognition software and phonetically similar errors are common. I apologize for that. Please donot hesitate to call for any questions due to above.\par \par Sincerely,\par Kellen Caraballo MD,FACC,SAHARA\par \par

## 2022-09-07 NOTE — CARDIOLOGY SUMMARY
[de-identified] : October 4, 2021 LV ejection fraction 60 to 65% mild mitral and aortic insufficiency ascending aorta 3.9 cm.  RVSP 23.  Possible PFO/small ASD.  Normal chamber dimensions. [de-identified] : Peripheral venous Doppler study October 1, 2021.  Bilateral no evidence of DVT and competent valves without significant reflux.  Baker's cyst.

## 2022-09-07 NOTE — REVIEW OF SYSTEMS
[Feeling Fatigued] : feeling fatigued [SOB] : shortness of breath [Dyspnea on exertion] : dyspnea during exertion [Lower Ext Edema] : lower extremity edema [Joint Pain] : joint pain [Joint Stiffness] : joint stiffness [Negative] : Heme/Lymph [FreeTextEntry2] : See HPI

## 2022-09-07 NOTE — REASON FOR VISIT
[Arrhythmia/ECG Abnorrmalities] : arrhythmia/ECG abnormalities [Hyperlipidemia] : hyperlipidemia [Hypertension] : hypertension [FreeTextEntry3] : Dr. Rosado [FreeTextEntry1] : 67-year-old gentleman comes in for follow-up consultation for management of his hypertension.  Since last seen he has been doing very well.  Except his blood pressure is still remaining slightly higher.\par Since last seen he has seen vascular surgery.  No significant findings were noted on venous Doppler study.  He is wearing compression stockings with improved edema.\par He also decrease his amlodipine down to 5 mg for worsening of the edema.\par He is being evaluated by rheumatologist for worsening of PMR.  And remains on low-dose of prednisone.\par He has stable dietary restrictions.\par He tries to remain active.\par He has no chest pain shortness of breath palpitation lightheaded dizziness\par He has no nausea vomiting hemoptysis hematemesis melena or dark loose stool\par He has no claudication pain \par He has lost weight\par He has no recent hospital admission

## 2022-12-05 ENCOUNTER — NON-APPOINTMENT (OUTPATIENT)
Age: 67
End: 2022-12-05

## 2022-12-05 ENCOUNTER — APPOINTMENT (OUTPATIENT)
Dept: CARDIOLOGY | Facility: CLINIC | Age: 67
End: 2022-12-05

## 2022-12-05 VITALS
OXYGEN SATURATION: 97 % | HEIGHT: 73 IN | WEIGHT: 194 LBS | SYSTOLIC BLOOD PRESSURE: 128 MMHG | HEART RATE: 68 BPM | BODY MASS INDEX: 25.71 KG/M2 | DIASTOLIC BLOOD PRESSURE: 60 MMHG

## 2022-12-05 DIAGNOSIS — Z01.810 ENCOUNTER FOR PREPROCEDURAL CARDIOVASCULAR EXAMINATION: ICD-10-CM

## 2022-12-05 PROCEDURE — 93000 ELECTROCARDIOGRAM COMPLETE: CPT | Mod: NC

## 2022-12-05 PROCEDURE — 99214 OFFICE O/P EST MOD 30 MIN: CPT | Mod: 25

## 2022-12-05 RX ORDER — LOSARTAN POTASSIUM AND HYDROCHLOROTHIAZIDE 25; 100 MG/1; MG/1
100-25 TABLET ORAL
Qty: 90 | Refills: 3 | Status: DISCONTINUED | COMMUNITY
Start: 2021-01-26 | End: 2022-12-05

## 2022-12-05 RX ORDER — AMLODIPINE BESYLATE 10 MG/1
10 TABLET ORAL DAILY
Qty: 90 | Refills: 2 | Status: DISCONTINUED | COMMUNITY
Start: 2022-04-27 | End: 2022-12-05

## 2022-12-05 NOTE — CARDIOLOGY SUMMARY
[de-identified] : December 5, 2022.  Normal sinus rhythm [de-identified] : October 4, 2021 LV ejection fraction 60 to 65% mild mitral and aortic insufficiency ascending aorta 3.9 cm.  RVSP 23.  Possible PFO/small ASD.  Normal chamber dimensions. [de-identified] : Peripheral venous Doppler study October 1, 2021.  Bilateral no evidence of DVT and competent valves without significant reflux.  Baker's cyst.

## 2022-12-05 NOTE — REASON FOR VISIT
[Arrhythmia/ECG Abnorrmalities] : arrhythmia/ECG abnormalities [Hyperlipidemia] : hyperlipidemia [Hypertension] : hypertension [FreeTextEntry3] : Dr. Rosado [FreeTextEntry1] : 67-year-old gentleman comes in for preoperative cardiac assessment prior to hip replacement.\par His activity level is decreased because of hip pain otherwise he was walking 3 miles a day without any significant problems\par He also decrease his amlodipine down to 5 mg for worsening of the edema.\par He is being evaluated by rheumatologist for worsening of PMR.  And remains on low-dose of prednisone.\par He has stable dietary restrictions.\par He tries to remain active.\par He has no chest pain shortness of breath palpitation lightheaded dizziness\par He has no nausea vomiting hemoptysis hematemesis melena or dark loose stool\par He has no claudication pain \par He has lost weight\par He has no recent hospital admission

## 2022-12-05 NOTE — DISCUSSION/SUMMARY
[FreeTextEntry1] : ASSESSMENT AND RECOMMENDATIONS:\par This is a 67-year-old white male with past medical history as detailed above, seen today, with the following active issues:\par At present, there are no active cardiac conditions.\par No recent unstable coronary syndrome, decompensated heart failure, severe valvular heart disease or significant dysrhythmias.\par The clinical benefit of the proposed procedure outweighs the associated cardiovascular risk.\par Risk not attenuated with further cardiovascular testing.\par Prior testing as outlined above.\par Optimized from a cardiovascular perspective.\par Control blood pressure, heart rate, pulse oximetry perioperatively.\par If required appropriate intravenous medication for the outpatient oral medication for blood pressure, heart rate control.\par DVT prophylaxis as per indication.\par \par #1 paroxysmal atrial fibrillation/aflutter, status post cryoablation.  No symptomatic recurrence.  We discussed potential recurrence and instructions to contact us for symptoms.  He has been taken off diltiazem and Xarelto by Dr. Rodrigues.  Aspirin 81 mg in presence of multiple cardiovascular risk factors.\par #2 hypertension.   No CHF, no renal insufficiency. Nonsmoker.  Mildly continue lifestyle modifications.  Continue amlodipine 5 mg because of worsening edema at 10 mg.  Change losartan/hydrochlorothiazide to valsartan/hydrochlorothiazide 320/25 mg.  And continue metoprolol low-dose.  If still difficult to control blood pressure change metoprolol to carvedilol labetalol.  And renal arterial Doppler study.  Risk benefits alternatives and side effects were reviewed.\par He will contact us for any change in his symptoms goal less than 130/80\par Low-salt diet lower alcohol intake strongly recommended.\par 3.  Edema.  Venous insufficiency evaluation was negative.  He has PMR.  And on amlodipine.  Recommended low-salt diet.  Compression stockings.  Continue lower dose of amlodipine.\par #4 dyslipidemia.  Continue rosuvastatin 10 mg.  Stable labs reviewed goals discussed\par #5 mitral insufficiency.  Aortic regurgitation.  Borderline pulmonary artery systolic pressure.  PFO.  Borderline aortic root dimension.  Follow-up echocardiogram.  This should not hold with surgery.\par \par Counseling regarding low saturated fat, salt and carbohydrate intake was reviewed. Active lifestyle and regular. Exercise along with weight management is advised.\par All the above were at length reviewed. Answered all the questions. Thank you very much for this kind referral. Please do not hesitate to give me a call for any question.\par Part of this transcription was done with voice recognition software and phonetically similar errors are common. I apologize for that. Please donot hesitate to call for any questions due to above.\par \par Sincerely,\par Kellen Caraballo MD,FACC,FASE\par \par

## 2022-12-05 NOTE — ASSESSMENT
[FreeTextEntry1] : Reviewed on November 19, 2021.\par Echocardiogram/venous Doppler study report as noted above\par EKG November 19, 2021 normal sinus rhythm nonspecific T wave poor baseline.  Labs from October 13, 2021.\par Stable CBC sodium 143 potassium 4.1 creatinine 0.83 LFT normal LDL 98 HDL 55 triglycerides 54 total cholesterol 164 N-terminal proBNP 73.5\par \par Reviewed on September 7, 2022.\par EKG showed normal sinus rhythm.\par Labs which were done recently showed stable CBC and creatinine.\par \par Reviewed on December 5, 2022\par EKG as noted above\par Labs from November 2, 2022 stable CBC.  Creatinine 1.01 LFT normal.

## 2022-12-12 ENCOUNTER — APPOINTMENT (OUTPATIENT)
Dept: CARDIOLOGY | Facility: CLINIC | Age: 67
End: 2022-12-12

## 2022-12-12 PROCEDURE — 93306 TTE W/DOPPLER COMPLETE: CPT

## 2022-12-22 ENCOUNTER — RX RENEWAL (OUTPATIENT)
Age: 67
End: 2022-12-22

## 2023-01-23 ENCOUNTER — RX RENEWAL (OUTPATIENT)
Age: 68
End: 2023-01-23

## 2023-02-16 ENCOUNTER — RX RENEWAL (OUTPATIENT)
Age: 68
End: 2023-02-16

## 2023-03-14 ENCOUNTER — APPOINTMENT (OUTPATIENT)
Dept: CARDIOLOGY | Facility: CLINIC | Age: 68
End: 2023-03-14

## 2023-03-29 ENCOUNTER — APPOINTMENT (OUTPATIENT)
Dept: CARDIOLOGY | Facility: CLINIC | Age: 68
End: 2023-03-29
Payer: COMMERCIAL

## 2023-03-29 VITALS
TEMPERATURE: 97.6 F | HEART RATE: 64 BPM | WEIGHT: 187 LBS | BODY MASS INDEX: 24.78 KG/M2 | HEIGHT: 73 IN | RESPIRATION RATE: 14 BRPM | OXYGEN SATURATION: 99 % | SYSTOLIC BLOOD PRESSURE: 142 MMHG | DIASTOLIC BLOOD PRESSURE: 78 MMHG

## 2023-03-29 DIAGNOSIS — I48.0 PAROXYSMAL ATRIAL FIBRILLATION: ICD-10-CM

## 2023-03-29 PROCEDURE — 99215 OFFICE O/P EST HI 40 MIN: CPT

## 2023-03-29 RX ORDER — METOPROLOL SUCCINATE 25 MG/1
25 TABLET, EXTENDED RELEASE ORAL
Qty: 90 | Refills: 3 | Status: DISCONTINUED | COMMUNITY
Start: 2022-03-08 | End: 2023-03-29

## 2023-03-29 NOTE — HISTORY OF PRESENT ILLNESS
[FreeTextEntry1] : HIMANSHU MATA is a 67 year old male with a past medical history of:\par \par HTN\par Atrial fibrillation. As above. No recurrent symptoms. cryoablation in March 2018. He was taken off Xarelto and off diltiazem by Dr. Rodrigues\par Dyslipidemia. Never did restart rosuvastatin. He has gradually weaned off the prednisone taking for his polymyalgia. He has been off steroids altogether for several weeks with no myalgias.\par Mild mitral regurgitation \par ?PFO\par \par Last seen 12/5/22. Cleared for left hip sx which was done 1/3/23. On 3/13/23 episodes of vertigo, spinning sensation, on and off, significant. Bp was 150/80. Wife called 911. Vertigo stopped when EMS arrived; lasted approx 20 minutes. CT in [facility] showed an aneurysm. It appears this was seen on prior CT in 2013 when he went to the ER with  after shoveling snow. Transferred to . MRI done with and without contrast. 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 DC'ed to f/u with neuro and cards. Saw Dr. Chen (neuro) who increased Amlodipine to 7.5 mg. Also she wants to start Eliquis. Patient saw neuro surgeon who recommends either cerebral angio gram vs craniotomy to clip aneurysm. He recommended holding off on starting Eliquis until after the aneurysm has been w/u and treatment if required.\par \par See full details of testing from hospital below.\par \par Only other complaint is some left lower extremity swelling. This has occurred in the past. He denies chest pain, pressure, palpitations, unusual shortness of breath, orthopnea, or syncope. Never a smoker. Lifts weights, bikes, without exertional complaints.\par \par \par Brother had a stroke ~ 2 months ago.\par \par Testing:\par \par Labs 3/13/23: Na 142, K 3.4, Cr 0.84, Ca 9.8, Trigs 101, Chol 161, HDL 50, LDL 91, WBC 8.34, Hgb 14, HCT 40.5, plt 163\par \par MR arteriorgram head w/o IV contrast 3/13/23: No occlusion or patent aneurysm. There is a 3mm focus nonenhancing focus in the left MCA bifurcation, may represent thrombosed aneurysm. \par \par MRI brain w/o and with IV contrast 3/13/23: No acute infarct, No enhancing lesion. There is a 3mm focus nonenhancing focus in the left MCA bifurcation, may represent thrombosed aneurysm.\par \par Labs 3/2023: WBC 6.47, Hgb 14.2, HCT 40.1, plt 143, ESR 8, Na 141, K 4.2, Cr 0.98, AST 23, ALT 20, Ca 9.8, Mag 1.9, TSH 1.85.\par \par CTA head and neck with contrast 3/13/23: CT angio of the neck no stenosis or definite dissection. CT angiography of head. 3-4 mm inferiorly projecting partially thrombosed left MCA bifurcation aneurysm. No intracranial vascular occlusion.\par \par CT brain w/o contrast 3/13/23; No acute lobar infarction or extra axial hematoma. 3mm hyper density in the left lateral sulcus may be focus of aneurysm or thrombus increase din conspicuity from prior study. Recommend CTA correlation. ICH: No. \par \par CXR 3/13/23: No infiltrates or effusions. There is a faint focal opactiy over the right lower hemithorax measuring 1.5 cm in size indeterminant. F/bermeo CT of the chest is suggested.\par \par \par EKG 3/13/23: SR with PACs at 60 bpm, KY interval 176 ms, QTc 428 ms, Q waves in lead III and nonspecific ST-T wave abnormalities \par \par Echo 12/12/22: CONCLUSIONS:\par 1. Left ventricular ejection fraction is visually estimated at 60 to 65%.\par 2. Left ventricular systolic function is normal.\par 3. Mild aortic regurgitation.\par 4. Mild tricuspid regurgitation.\par 5. Normal pericardium. No pericardial effusion seen.\par 6. The proximal ascending aorta is borderline dilated.\par 7. Compared to prior done on 10/14/2021, no color doppler visualized crossing IAS.\par Consider bubble study for varying results.\par 8. Trace mitral valve regurgitation.\par Ao 3.3 cm, Ao Asc 3.8 cm, Ao Asc prox 3.8 cm, Ao arch 3 cm\par \par [facility]io 2017: SR with PAF. Average HR 73 bpm. PVCs were rare to occasional with rare couplets noted. NSVT 3-4 beats at rate up to 177 bpm noted.\par \alisa Almanza 2015: No evidence of ischemia by EKG. Normal myocardial perfusion and wall motion study. Anterior attenuation noted. This reduces specificity of the study.

## 2023-03-29 NOTE — DISCUSSION/SUMMARY
[FreeTextEntry1] : HIMANSHU MATA is a 67 year old M who presents today Mar 29, 2023 with the above history and the following active issues:\par \par Recent possible TIA event/diagnosis of thrombosed unruptured aneurysm in MCA area. Following with neuro and neuro surgery. BP not well controlled. Stop Metoprolol. Start Labetalol 100mg PO BID. Obtain 14 day live Zio monitor to determine presence of arrhythmias or significant pauses. \par \par paroxysmal atrial fibrillation/aflutter, status post cryoablation. No symptomatic recurrence. We discussed potential recurrence and instructions to contact us for symptoms. He has been taken off diltiazem and Xarelto by Dr. Rodrigues. Aspirin 81 mg in presence of multiple cardiovascular risk factors. Zio as above.\par \par hypertension. No CHF, no renal insufficiency. Nonsmoker. Mildly continue lifestyle modifications. Continue amlodipine 7.5 mg because of worsening edema at 10 mg. Continue valsartan/hydrochlorothiazide 320/25 mg. And continue metoprolol low-dose. If still difficult to control blood pressure obtain renal arterial Doppler study. Risk benefits alternatives and side effects were reviewed.\par He will contact us for any change in his symptoms goal less than 130/80\par Low-salt diet lower alcohol intake strongly recommended.\par \par Venous insufficiency evaluation was negative. He has PMR. And on amlodipine. Recommended low-salt diet. Compression stockings. Continue lower dose of amlodipine.\par \par dyslipidemia. Continue rosuvastatin 10 mg. Stable labs reviewed goals discussed\par \par mitral insufficiency. Aortic regurgitation. Borderline pulmonary artery systolic pressure. PFO. On ASA. Borderline aortic root dimension. Surveillance monitoring.\par \par Incidental findings during hospitalization discussed; especially findings on CXR. Copies of records given per patient request and he will f/u with PCP.\par \par Ongoing f/u with PCP.\par \par Close clinical follow up.\par \par F/U after testing to review results (BP check and Zio review).\par Discussed red flag symptoms, which would warrant sooner or emergent medical evaluation.\par Any questions and concerns were addressed and resolved.\par \par Sincerely,\par Clarisse [facility]-BC\par Patient's history, testing, and plan was reviewed with supervising physician, Dr. Kellen Caraballo

## 2023-03-29 NOTE — PHYSICAL EXAM
[Well Developed] : well developed [Well Nourished] : well nourished [No Acute Distress] : no acute distress [Normal Conjunctiva] : normal conjunctiva [Normal Venous Pressure] : normal venous pressure [No Carotid Bruit] : no carotid bruit [Normal S1, S2] : normal S1, S2 [No Murmur] : no murmur [No Rub] : no rub [No Gallop] : no gallop [Clear Lung Fields] : clear lung fields [Good Air Entry] : good air entry [No Respiratory Distress] : no respiratory distress  [Soft] : abdomen soft [Non Tender] : non-tender [No Masses/organomegaly] : no masses/organomegaly [Normal Bowel Sounds] : normal bowel sounds [Normal Gait] : normal gait [No Edema] : no edema [No Cyanosis] : no cyanosis [No Clubbing] : no clubbing [No Varicosities] : no varicosities [No Rash] : no rash [No Skin Lesions] : no skin lesions [Moves all extremities] : moves all extremities [No Focal Deficits] : no focal deficits [Normal Speech] : normal speech [Alert and Oriented] : alert and oriented [Normal memory] : normal memory [de-identified] : left lower extremity edema, slightly larger than right leg. No calf pain or tenderness.

## 2023-03-30 ENCOUNTER — NON-APPOINTMENT (OUTPATIENT)
Age: 68
End: 2023-03-30

## 2023-04-14 ENCOUNTER — APPOINTMENT (OUTPATIENT)
Dept: CARDIOLOGY | Facility: CLINIC | Age: 68
End: 2023-04-14
Payer: COMMERCIAL

## 2023-04-14 PROCEDURE — 93248 EXT ECG>7D<15D REV&INTERPJ: CPT

## 2023-04-24 NOTE — ASSESSMENT
[FreeTextEntry1] : Reviewed on November 19, 2021.\par Echocardiogram/venous Doppler study report as noted above\par EKG November 19, 2021 normal sinus rhythm nonspecific T wave poor baseline.  Labs from October 13, 2021.\par Stable CBC sodium 143 potassium 4.1 creatinine 0.83 LFT normal LDL 98 HDL 55 triglycerides 54 total cholesterol 164 N-terminal proBNP 73.5\par  [Mother] : mother [Yes] : Patient goes to dentist yearly [Toothpaste] : Primary Fluoride Source: Toothpaste [Up to date] : Up to date [No] : Patient has not had sexual intercourse [HIV Screening Declined] : HIV Screening Declined [Has ways to cope with stress] : has ways to cope with stress [Displays self-confidence] : displays self-confidence [With Teen] : teen [Has problems with sleep] : does not have problems with sleep [Gets depressed, anxious, or irritable/has mood swings] : does not get depressed, anxious, or irritable/has mood swings [Has thought about hurting self or considered suicide] : has not thought about hurting self or considered suicide [FreeTextEntry7] : 16 year routine physical  [de-identified] : none [FreeTextEntry1] : Patient is doing well - has no concerns or issues.  \par Parent(s) have no current concerns or issues. \par No change in health status since last WCC\par No chest pains or difficulty of breathing reported\par No reactions to previous vaccinations.\par Denies depression or psychiatric issues. \par No mental health issues, not in counseling.\par No suicidal ideations\par Appetite good, no issues\par No new allergies reported, has peanut allergy, no Epipen\par Not sexually active\par Denies tobacco, ETOH, drug use or vaping\par No tobacco exposure\par Sleeping well with good sleeping patterns \par No recent severe illness or injury\par No emergency room visits\par No trauma to the head /concussion.\par Current thgthrthathdtheth:th th1th0th No problems in school identified -  no ADD/ADHD concerns\par Activities: no sports\par PHQ9 and CRAFFT reviewed, no issues\par Coordination of Care reviewed, no issues\par Has been to the dentist within last 12 months\par

## 2023-04-26 ENCOUNTER — APPOINTMENT (OUTPATIENT)
Dept: CARDIOLOGY | Facility: CLINIC | Age: 68
End: 2023-04-26
Payer: COMMERCIAL

## 2023-04-26 VITALS
BODY MASS INDEX: 24.92 KG/M2 | HEART RATE: 65 BPM | SYSTOLIC BLOOD PRESSURE: 136 MMHG | DIASTOLIC BLOOD PRESSURE: 70 MMHG | OXYGEN SATURATION: 99 % | HEIGHT: 73 IN | WEIGHT: 188 LBS

## 2023-04-26 DIAGNOSIS — I72.9 ANEURYSM OF UNSPECIFIED SITE: ICD-10-CM

## 2023-04-26 DIAGNOSIS — Q21.1 ATRIAL SEPTAL DEFECT: ICD-10-CM

## 2023-04-26 PROCEDURE — 99214 OFFICE O/P EST MOD 30 MIN: CPT

## 2023-04-26 RX ORDER — AMLODIPINE BESYLATE 2.5 MG/1
2.5 TABLET ORAL DAILY
Refills: 0 | Status: DISCONTINUED | COMMUNITY
End: 2023-04-26

## 2023-04-26 NOTE — DISCUSSION/SUMMARY
[FreeTextEntry1] : HIMANSHU MATA is a 67 year old M who presents today Apr 26, 2023 with the above history and the following active issues:\par \par Per patient report, neuro has stated they did not feel the aforementioned event was a stroke. His cerebral angiogram 4/5/23 demonstrated no angiographic evidence of intracranial aneurysm or vascular lesion was detected in this study. He will be following up with another neurologist for a second opinion giving contradicting reports on imaging and cerebral angiogram.\par \par paroxysmal atrial fibrillation/aflutter, status post cryoablation. No symptomatic recurrence. We discussed potential recurrence and instructions to contact us for symptoms. He has been taken off diltiazem and Xarelto by Dr. Rodrigues. Aspirin 81 mg in presence of multiple cardiovascular risk factors. He may remain off Xarelto at present.\par \par hypertension. No CHF, no renal insufficiency. Nonsmoker. Mildly continue lifestyle modifications. Increase amlodipine to 7.5 mg because of worsening edema at 10 mg. Continue valsartan/hydrochlorothiazide 320/25 mg. And continue Labetalol low-dose. If still difficult to control blood pressure obtain renal arterial Doppler study. Risk benefits alternatives and side effects were reviewed.\par He will contact us for any change in his symptoms goal less than 130/80\par Low-salt diet lower alcohol intake strongly recommended.\par If still sx's of Labetalol, consider switching to Coreg.\par \par Venous insufficiency evaluation was negative. He has PMR. And on amlodipine. Recommended low-salt diet. Compression stockings. Continue lower dose of amlodipine.\par \par dyslipidemia. Continue rosuvastatin 10 mg. Stable labs reviewed goals discussed\par \par mitral insufficiency. Aortic regurgitation. Borderline pulmonary artery systolic pressure. PFO. On ASA. Borderline aortic root dimension. Surveillance monitoring.\par \par Incidental findings during hospitalization discussed at prior OV; especially findings on CXR. Copies of records given per patient request and he will f/u with PCP.\par \par Ongoing f/u with PCP.\par \par Close clinical follow up.\par \par F/U: BP check in 1 month.\par Discussed red flag symptoms, which would warrant sooner or emergent medical evaluation.\par Any questions and concerns were addressed and resolved.\par \par Sincerely,\par Clarisse MÁRQUEZP-BC\par Patient's history, testing, and plan was reviewed with supervising physician, Dr. Kellen Caraballo

## 2023-04-26 NOTE — HISTORY OF PRESENT ILLNESS
[FreeTextEntry1] : HIMANSHU MATA is a 67 year old male with a past medical history of:\par \par HTN\par Atrial fibrillation. As above. No recurrent symptoms. cryoablation in March 2018. He was taken off Xarelto and off diltiazem by Dr. Rodrigues\par Dyslipidemia. Never did restart rosuvastatin. He has gradually weaned off the prednisone taking for his polymyalgia. He has been off steroids altogether for several weeks with no myalgias.\par Mild mitral regurgitation \par ?PFO\par \par Seen 12/5/22. Cleared for left hip sx which was done 1/3/23. On 3/13/23 episodes of vertigo, spinning sensation, on and off, significant. BP was 150/80. Wife called 911. Vertigo stopped when EMS arrived; lasted approx 20 minutes. CT in [facility] showed an aneurysm. It appears this was seen on prior CT in 2013 when he went to the ER with  after shoveling snow. Transferred to . MRI done with and without contrast. 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 DC'ed to f/u with neuro and cards. Saw Dr. Chen (neuro) who increased Amlodipine to 7.5 mg. Also she wants to start Eliquis. Patient saw neuro surgeon who recommends either cerebral angio gram vs craniotomy to clip aneurysm. He recommended holding off on starting Eliquis until after the aneurysm has been w/u and treatment if required.\par \par \par Last seen 3/29/23. It was advised to Stop Metoprolol and start Labetalol 100mg PO BID. 14 day live zio ordered. In the interim he had cerebral angiogram 4/5/23 that did not demonstrate aneurysm or lesion. Write site healing well. He has mistakenly been taking Amlodipine 5mg daily instead of 7.5 mg daily. Only complaint is some fatigue and intermittent LLE swelling, although there is no edema on exam today. Never a smoker. Lifts weights, bikes, without exertional complaints.\par \par \par Brother had a stroke ~ 3 months ago.\par \par Testing:\par \par Cerebral angiogram 4/5/23: No angiographic evidence of intracranial aneurysm or vascular lesion was detected in this study.\par \par Zio 3/29/23-4/5/23: SR average HR 66 bpm. PAC burdn 1.3%. Rare SV couplets and triplets. PVC burden <1%. Sx's with SR, artifact,a dn PVCs. Multifocal PVCs. Brief AT up to 32.6 seconds.\par \par Labs 3/13/23: Na 142, K 3.4, Cr 0.84, Ca 9.8, Trigs 101, Chol 161, HDL 50, LDL 91, WBC 8.34, Hgb 14, HCT 40.5, plt 163\par \par MR arteriorgram head w/o IV contrast 3/13/23: No occlusion or patent aneurysm. There is a 3mm focus nonenhancing focus in the left MCA bifurcation, may represent thrombosed aneurysm. \par \par MRI brain w/o and with IV contrast 3/13/23: No acute infarct, No enhancing lesion. There is a 3mm focus nonenhancing focus in the left MCA bifurcation, may represent thrombosed aneurysm.\par \par Labs 3/2023: WBC 6.47, Hgb 14.2, HCT 40.1, plt 143, ESR 8, Na 141, K 4.2, Cr 0.98, AST 23, ALT 20, Ca 9.8, Mag 1.9, TSH 1.85.\par \par CTA head and neck with contrast 3/13/23: CT angio of the neck no stenosis or definite dissection. CT angiography of head. 3-4 mm inferiorly projecting partially thrombosed left MCA bifurcation aneurysm. No intracranial vascular occlusion.\par \par CT brain w/o contrast 3/13/23; No acute lobar infarction or extra axial hematoma. 3mm hyper density in the left lateral sulcus may be focus of aneurysm or thrombus increase din conspicuity from prior study. Recommend CTA correlation. ICH: No. \par \par CXR 3/13/23: No infiltrates or effusions. There is a faint focal opactiy over the right lower hemithorax measuring 1.5 cm in size indeterminant. F/bermeo CT of the chest is suggested.\par \par \par EKG 3/13/23: SR with PACs at 60 bpm, AZ interval 176 ms, QTc 428 ms, Q waves in lead III and nonspecific ST-T wave abnormalities \par \par Echo 12/12/22: CONCLUSIONS:\par 1. Left ventricular ejection fraction is visually estimated at 60 to 65%.\par 2. Left ventricular systolic function is normal.\par 3. Mild aortic regurgitation.\par 4. Mild tricuspid regurgitation.\par 5. Normal pericardium. No pericardial effusion seen.\par 6. The proximal ascending aorta is borderline dilated.\par 7. Compared to prior done on 10/14/2021, no color doppler visualized crossing IAS.\par Consider bubble study for varying results.\par 8. Trace mitral valve regurgitation.\par Ao 3.3 cm, Ao Asc 3.8 cm, Ao Asc prox 3.8 cm, Ao arch 3 cm\par \par Zio 2017: SR with PAF. Average HR 73 bpm. PVCs were rare to occasional with rare couplets noted. NSVT 3-4 beats at rate up to 177 bpm noted.\par \par Nuke 2015: No evidence of ischemia by EKG. Normal myocardial perfusion and wall motion study. Anterior attenuation noted. This reduces specificity of the study.

## 2023-04-26 NOTE — REVIEW OF SYSTEMS
[Negative] : Heme/Lymph [Feeling Fatigued] : feeling fatigued [Lower Ext Edema] : lower extremity edema

## 2023-05-15 ENCOUNTER — RX RENEWAL (OUTPATIENT)
Age: 68
End: 2023-05-15

## 2023-05-15 RX ORDER — ROSUVASTATIN CALCIUM 10 MG/1
10 TABLET, FILM COATED ORAL
Qty: 90 | Refills: 3 | Status: ACTIVE | COMMUNITY
Start: 2022-04-27 | End: 1900-01-01

## 2023-06-06 ENCOUNTER — RX CHANGE (OUTPATIENT)
Age: 68
End: 2023-06-06

## 2023-06-06 ENCOUNTER — APPOINTMENT (OUTPATIENT)
Dept: CARDIOLOGY | Facility: CLINIC | Age: 68
End: 2023-06-06
Payer: COMMERCIAL

## 2023-06-06 VITALS
SYSTOLIC BLOOD PRESSURE: 114 MMHG | OXYGEN SATURATION: 97 % | BODY MASS INDEX: 24.92 KG/M2 | HEART RATE: 64 BPM | HEIGHT: 73 IN | DIASTOLIC BLOOD PRESSURE: 60 MMHG | WEIGHT: 188 LBS

## 2023-06-06 PROCEDURE — 99214 OFFICE O/P EST MOD 30 MIN: CPT

## 2023-06-06 RX ORDER — AMLODIPINE BESYLATE 2.5 MG/1
2.5 TABLET ORAL DAILY
Qty: 30 | Refills: 3 | Status: DISCONTINUED | COMMUNITY
Start: 2023-04-26 | End: 2023-06-06

## 2023-06-06 RX ORDER — PREDNISONE 1 MG/1
1 TABLET ORAL DAILY
Refills: 0 | Status: DISCONTINUED | COMMUNITY
End: 2023-06-06

## 2023-06-06 NOTE — CARDIOLOGY SUMMARY
[de-identified] : December 5, 2022.  Normal sinus rhythm\par \par EKG 3/13/23: SR with PACs at 60 bpm, AR interval 176 ms, QTc 428 ms, Q waves in lead III and nonspecific ST-T wave abnormalities \par  [de-identified] : Event monitor.  April 2023.  PVCs PACs.  Brief atrial tachycardia.  Baseline normal sinus rhythm.  Average 66 [de-identified] : October 4, 2021 LV ejection fraction 60 to 65% mild mitral and aortic insufficiency ascending aorta 3.9 cm.  RVSP 23.  Possible PFO/small ASD.  Normal chamber dimensions.\par Echo 12/12/22: CONCLUSIONS:\par 1. Left ventricular ejection fraction is visually estimated at 60 to 65%.\par 2. Left ventricular systolic function is normal.\par 3. Mild aortic regurgitation.\par 4. Mild tricuspid regurgitation.\par 5. Normal pericardium. No pericardial effusion seen.\par 6. The proximal ascending aorta is borderline dilated.\par Ao 3.3 cm, Ao Asc 3.8 cm, Ao Asc prox 3.8 cm, Ao arch 3 cm [de-identified] : Cerebral angiography.  April 2023.  No aneurysm. [de-identified] : Peripheral venous Doppler study October 1, 2021.  Bilateral no evidence of DVT and competent valves without significant reflux.  Baker's cyst.

## 2023-06-06 NOTE — PHYSICAL EXAM
[Well Developed] : well developed [Well Nourished] : well nourished [No Acute Distress] : no acute distress [Normal Venous Pressure] : normal venous pressure [No Carotid Bruit] : no carotid bruit [Normal S1, S2] : normal S1, S2 [No Murmur] : no murmur [No Gallop] : no gallop [No Rub] : no rub [Clear Lung Fields] : clear lung fields [Good Air Entry] : good air entry [No Respiratory Distress] : no respiratory distress  [Normal Gait] : normal gait [No Cyanosis] : no cyanosis [No Clubbing] : no clubbing [No Varicosities] : no varicosities [Moves all extremities] : moves all extremities [Normal Speech] : normal speech [Alert and Oriented] : alert and oriented [Normal Radial B/L] : normal radial B/L [Normal DP B/L] : normal DP B/L [Edema ___] : edema [unfilled]

## 2023-06-06 NOTE — REVIEW OF SYSTEMS
[Lower Ext Edema] : lower extremity edema [Feeling Fatigued] : feeling fatigued [Negative] : Heme/Lymph [FreeTextEntry5] : As noted in HPI

## 2023-06-06 NOTE — REASON FOR VISIT
[Symptom and Test Evaluation] : symptom and test evaluation [Hyperlipidemia] : hyperlipidemia [Hypertension] : hypertension [FreeTextEntry3] : Dr. Little

## 2023-06-06 NOTE — DISCUSSION/SUMMARY
[FreeTextEntry1] : HIMANSHU MATA is a 67 year old M who presents today June 6, 2020 with the above history and the following active issues:\par \par Per patient report, neuro has stated they did not feel the aforementioned event was a stroke. His cerebral angiogram 4/5/23 demonstrated no angiographic evidence of intracranial aneurysm or vascular lesion was detected in this study.  Continue to control blood pressure heart rate cholesterol.  Follow recommendations by neurosurgery.\par \par paroxysmal atrial fibrillation/aflutter, status post cryoablation. No symptomatic recurrence. We discussed potential recurrence and instructions to contact us for symptoms. He has been taken off diltiazem and Xarelto by Dr. Rodrigues. Aspirin 81 mg in presence of multiple cardiovascular risk factors. He may remain off Xarelto at present.\par \par hypertension. No CHF, no renal insufficiency. Nonsmoker.  Thoracic aortic ectasia.  Mild ankle edema.  Blood pressure very well controlled on repeated examination.  Goal less than 130/80.  Decrease amlodipine to 5 mg.  Continue with labetalol 100 mg twice daily, valsartan/hydrochlorothiazide 320/25 mg.  If continued edema decrease amlodipine to 2.5 mg.  As long as blood pressure remains stable.  If increase may need to consider increasing dose of labetalol.\par Low-salt diet lower alcohol intake strongly recommended.\par Continue regular activity exercise\par \par Venous insufficiency evaluation was negative. He has PMR. And on amlodipine. Recommended low-salt diet. Compression stockings. Continue lower dose of amlodipine.\par \par dyslipidemia. Continue rosuvastatin 10 mg. Stable labs reviewed goals discussed\par \par mitral insufficiency. Aortic regurgitation. Borderline pulmonary artery systolic pressure.  Possible PFO. On ASA. Borderline aortic root dimension. Surveillance monitoring with echocardiogram\par \par Counseling regarding low saturated fat, salt and carbohydrate intake was reviewed. Active lifestyle and regular. Exercise along with weight management is advised.\par All the above were at length reviewed. Answered all the questions. Thank you very much for this kind referral. Please do not hesitate to give me a call for any question.\par Part of this transcription was done with voice recognition software and phonetically similar errors are common. I apologize for that. Please do not hesitate to call for any questions due to above.\par \par Sincerely,\par Kellen Caraballo MD,FACC,FASE\par

## 2023-06-06 NOTE — ASSESSMENT
[FreeTextEntry1] : Reviewed on June 6, 2023.\par Event monitor reviewed.\par May 23, 2023 sodium 141 potassium 4.1 creatinine 0.82 LFT normal triglycerides 111 HDL 6047 LDL 79\par \par \par \par

## 2023-06-06 NOTE — HISTORY OF PRESENT ILLNESS
[FreeTextEntry1] : HIMANSHU MATA is a 67 year old male with a past medical history of:\par \par HTN\par Atrial fibrillation. As above. No recurrent symptoms. cryoablation in March 2018. He was taken off Xarelto and off diltiazem by Dr. Rodrigues\par Dyslipidemia.  On rosuvastatin 10 mg\par Mild mitral regurgitation \par ?PFO\par Thoracic aortic ectasia\par \par Since last seen his main complaint is bilateral ankle edema.  He decrease his amlodipine to 5 mg.  He still has mild ankle edema.  He is tolerating his labetalol/valsartan/hydrochlorothiazide well.  His blood pressure is well controlled.\par He has no chest pain.  No PND orthopnea.  No palpitation dizziness lightheadedness near syncopal or syncopal event.\par He is very active.  Dietary restrictions stable.\par \par \par

## 2023-08-01 ENCOUNTER — RX RENEWAL (OUTPATIENT)
Age: 68
End: 2023-08-01

## 2023-12-30 ENCOUNTER — NON-APPOINTMENT (OUTPATIENT)
Age: 68
End: 2023-12-30

## 2024-02-13 NOTE — PHYSICAL EXAM
[Well Developed] : well developed [Well Nourished] : well nourished [No Acute Distress] : no acute distress [Normal Venous Pressure] : normal venous pressure [No Carotid Bruit] : no carotid bruit [Normal S1, S2] : normal S1, S2 [No Murmur] : no murmur [No Rub] : no rub [No Gallop] : no gallop [Clear Lung Fields] : clear lung fields [Good Air Entry] : good air entry [No Respiratory Distress] : no respiratory distress  [Normal Gait] : normal gait [No Cyanosis] : no cyanosis [No Clubbing] : no clubbing [No Varicosities] : no varicosities [Normal Radial B/L] : normal radial B/L [Normal DP B/L] : normal DP B/L [Edema ___] : edema [unfilled] [Normal Speech] : normal speech [Moves all extremities] : moves all extremities [Alert and Oriented] : alert and oriented

## 2024-02-14 ENCOUNTER — APPOINTMENT (OUTPATIENT)
Dept: CARDIOLOGY | Facility: CLINIC | Age: 69
End: 2024-02-14
Payer: COMMERCIAL

## 2024-02-14 ENCOUNTER — NON-APPOINTMENT (OUTPATIENT)
Age: 69
End: 2024-02-14

## 2024-02-14 VITALS
DIASTOLIC BLOOD PRESSURE: 80 MMHG | BODY MASS INDEX: 24.41 KG/M2 | HEART RATE: 63 BPM | SYSTOLIC BLOOD PRESSURE: 156 MMHG | OXYGEN SATURATION: 97 % | WEIGHT: 185 LBS

## 2024-02-14 DIAGNOSIS — I44.7 LEFT BUNDLE-BRANCH BLOCK, UNSPECIFIED: ICD-10-CM

## 2024-02-14 PROCEDURE — 93306 TTE W/DOPPLER COMPLETE: CPT

## 2024-02-14 PROCEDURE — 93000 ELECTROCARDIOGRAM COMPLETE: CPT

## 2024-02-14 PROCEDURE — 99215 OFFICE O/P EST HI 40 MIN: CPT

## 2024-02-14 PROCEDURE — G2211 COMPLEX E/M VISIT ADD ON: CPT

## 2024-02-14 NOTE — DISCUSSION/SUMMARY
[FreeTextEntry1] : HIMANSHU MATA is a 68 year old M who presents today  with the above history and the following active issues:  Per patient report, neuro has stated they did not feel the aforementioned event was a stroke. His cerebral angiogram 4/5/23 demonstrated no angiographic evidence of intracranial aneurysm or vascular lesion was detected in this study.  Continue to control blood pressure heart rate cholesterol.  Follow recommendations by neurosurgery.  new LBBB.  No chest pain.  Good exercise tolerance.  Echocardiogram with preserved EF. pharm nuke on meds.  Risk benefits alternatives of the test reviewed.  Technetium 99 radioisotope use was reviewed.  Risks, benefits, alternatives of use of radioisotope was discussed at length.  Patient verbalized understanding and agreed with the management plan.  As long as no significant ischemia in presence of preserved LV systolic function Conduction system abnormality and less likely to be ischemic in origin.  Reviewed possible etiologies of left bundle branch block.  If any change in clinical status he understands to call 911 and get in touch with us.  paroxysmal atrial fibrillation/aflutter, status post cryoablation. No symptomatic recurrence. We discussed potential recurrence and instructions to contact us for symptoms. He has been taken off diltiazem and Xarelto by Dr. Rodrigeus. Aspirin 81 mg in presence of multiple cardiovascular risk factors.  hypertension. No CHF, no renal insufficiency. Nonsmoker.  Thoracic aortic ectasia.  Mild ankle edema.  Blood pressure very well controlled on repeated examination.  Goal less than 130/80.  amlodipine to 2.5 mg.  Continue with labetalol 100 mg twice daily, valsartan/hydrochlorothiazide 320/25 mg.   As long as blood pressure remains stable.  If increase may need to consider increasing dose of labetalol. Low-salt diet lower alcohol intake strongly recommended. Continue regular activity exercise  Venous insufficiency evaluation was negative. He has PMR. And on amlodipine. Recommended low-salt diet. Compression stockings. Continue lower dose of amlodipine.  dyslipidemia. Continue rosuvastatin 10 mg. Stable labs reviewed goals discussed if persistent LDL cholesterol greater than 70 consider increasing rosuvastatin to 20 mg.  mitral insufficiency. Aortic regurgitation. Borderline pulmonary artery systolic pressure.  Possible PFO. On ASA. Borderline aortic root dimension.  Relatively stable. Aortic root dimensions.  Will follow with echocardiogram.  CTA   As needed.  Counseling regarding low saturated fat, salt and carbohydrate intake was reviewed. Active lifestyle and regular. Exercise along with weight management is advised.  All the above were at length reviewed. Answered all the questions. Thank you very much for this kind referral. Please do not hesitate to give me a call for any question.  Part of this transcription was done with voice recognition software and phonetically similar errors are common. I apologize for that. Please do not hesitate to call for any questions due to above.    Sincerely,  Keleln Caraballo MD,FACC,FASE .

## 2024-02-14 NOTE — REVIEW OF SYSTEMS
[Feeling Fatigued] : feeling fatigued [Lower Ext Edema] : lower extremity edema [Negative] : Heme/Lymph [FreeTextEntry5] : As noted in HPI

## 2024-02-14 NOTE — HISTORY OF PRESENT ILLNESS
[FreeTextEntry1] : HIMANSHU MATA is a 68 year old male with a past medical history of:  HTN Atrial fibrillation. As above. No recurrent symptoms. cryoablation in March 2018. He was taken off Xarelto and off diltiazem by Dr. Lilia Jones.  On rosuvastatin 10 mg Mild mitral regurgitation  ?PFO Thoracic aortic ectasia  Since last seen on amlodipine 2.5 mg and his edema has definitely improved.  He is on labetalol 100 mg twice daily.  Tolerating it well.  And ARB/hydrochlorothiazide in the form of valsartan/hydrochlorothiazide 320/25 once daily.  He is very active.  Controls his diet.  And has no other symptoms.  He has noticed though that his blood pressure is elevated at home and remaining in 140s over 90s. He has no chest pain.  No PND orthopnea.  No palpitation dizziness lightheadedness near syncopal or syncopal event. He has no increased salt intake or alcohol intake.

## 2024-02-14 NOTE — CARDIOLOGY SUMMARY
[de-identified] : December 5, 2022.  Normal sinus rhythm EKG 3/13/23: SR with PACs at 60 bpm, ID interval 176 ms, QTc 428 ms, Q waves in lead III and nonspecific ST-T wave abnormalities  2/14/24 sinus bradycardia LBBB [de-identified] : Event monitor.  April 2023.  PVCs PACs.  Brief atrial tachycardia.  Baseline normal sinus rhythm.  Average 66 [de-identified] : Cerebral angiography.  April 2023.  No aneurysm. [de-identified] : October 4, 2021 LV ejection fraction 60 to 65% mild mitral and aortic insufficiency ascending aorta 3.9 cm.  RVSP 23.  Possible PFO/small ASD.  Normal chamber dimensions. Echo 12/12/22: CONCLUSIONS: 1. Left ventricular ejection fraction is visually estimated at 60 to 65%. 2. Left ventricular systolic function is normal. 3. Mild aortic regurgitation. 4. Mild tricuspid regurgitation. 5. Normal pericardium. No pericardial effusion seen. 6. The proximal ascending aorta is borderline dilated. Ao 3.3 cm, Ao Asc 3.8 cm, Ao Asc prox 3.8 cm, Ao arch 3 cm February 14, 2024.  Ascending aorta 4.1 cm.  Preserved EF.  No significant wall motion abnormality. [de-identified] : Peripheral venous Doppler study October 1, 2021.  Bilateral no evidence of DVT and competent valves without significant reflux.  Baker's cyst.

## 2024-02-27 ENCOUNTER — APPOINTMENT (OUTPATIENT)
Dept: CARDIOLOGY | Facility: CLINIC | Age: 69
End: 2024-02-27
Payer: COMMERCIAL

## 2024-02-27 PROCEDURE — 78452 HT MUSCLE IMAGE SPECT MULT: CPT

## 2024-02-27 PROCEDURE — A9502: CPT

## 2024-02-27 PROCEDURE — 93015 CV STRESS TEST SUPVJ I&R: CPT

## 2024-03-20 ENCOUNTER — NON-APPOINTMENT (OUTPATIENT)
Age: 69
End: 2024-03-20

## 2024-03-20 ENCOUNTER — APPOINTMENT (OUTPATIENT)
Dept: CARDIOLOGY | Facility: CLINIC | Age: 69
End: 2024-03-20
Payer: COMMERCIAL

## 2024-03-20 VITALS
OXYGEN SATURATION: 98 % | HEART RATE: 67 BPM | BODY MASS INDEX: 24.12 KG/M2 | HEIGHT: 73 IN | WEIGHT: 182 LBS | SYSTOLIC BLOOD PRESSURE: 120 MMHG | DIASTOLIC BLOOD PRESSURE: 70 MMHG

## 2024-03-20 DIAGNOSIS — R42 DIZZINESS AND GIDDINESS: ICD-10-CM

## 2024-03-20 DIAGNOSIS — I34.0 NONRHEUMATIC MITRAL (VALVE) INSUFFICIENCY: ICD-10-CM

## 2024-03-20 DIAGNOSIS — N28.9 DISORDER OF KIDNEY AND URETER, UNSPECIFIED: ICD-10-CM

## 2024-03-20 PROCEDURE — G2211 COMPLEX E/M VISIT ADD ON: CPT

## 2024-03-20 PROCEDURE — 93000 ELECTROCARDIOGRAM COMPLETE: CPT

## 2024-03-20 PROCEDURE — 99215 OFFICE O/P EST HI 40 MIN: CPT

## 2024-03-20 RX ORDER — VALSARTAN AND HYDROCHLOROTHIAZIDE 320; 12.5 MG/1; MG/1
320-12.5 TABLET, FILM COATED ORAL DAILY
Qty: 90 | Refills: 1 | Status: ACTIVE | COMMUNITY
Start: 2022-12-05 | End: 1900-01-01

## 2024-03-20 RX ORDER — FOLIC ACID 1 MG/1
1 TABLET ORAL
Qty: 90 | Refills: 0 | Status: DISCONTINUED | COMMUNITY
Start: 2022-09-20 | End: 2024-03-20

## 2024-03-20 RX ORDER — PREDNISONE 5 MG/1
5 TABLET ORAL DAILY
Refills: 0 | Status: DISCONTINUED | COMMUNITY
End: 2024-03-20

## 2024-03-20 RX ORDER — AMLODIPINE BESYLATE 2.5 MG/1
2.5 TABLET ORAL DAILY
Qty: 90 | Refills: 3 | Status: DISCONTINUED | COMMUNITY
Start: 2023-05-15 | End: 2024-03-20

## 2024-03-20 RX ORDER — METHOTREXATE 2.5 MG/1
2.5 TABLET ORAL WEEKLY
Refills: 0 | Status: DISCONTINUED | COMMUNITY
Start: 2022-09-20 | End: 2024-03-20

## 2024-03-20 NOTE — ASSESSMENT
[FreeTextEntry1] : Reviewed on June 6, 2023. Event monitor reviewed. May 23, 2023 sodium 141 potassium 4.1 creatinine 0.82 LFT normal triglycerides 111 HDL 6047 LDL 79  reviewed 2/14/24 ekg reviewed Labs from November 2023 also were reviewed showing stable CMP LDL 94 HDL 43  Reviewed on March 20, 2024. EKG as noted above Nuclear marker perfusion scan and echocardiogram as discussed and noted above. Most recent labs March 13, 2024 aldosterone level 17 metanephrine level 79 total free metanephrine level 222 mildly elevated.  Creatinine 1.46 potassium 5.8 sodium 139.      
dentures/glasses

## 2024-03-20 NOTE — HISTORY OF PRESENT ILLNESS
[FreeTextEntry1] : HIMANSHU MATA is a 68 year old male with a past medical history of:  HTN Atrial fibrillation. No recurrent symptoms. cryoablation in March 2018. He was taken off Xarelto and off diltiazem by Dr. Lilia Jones.  On rosuvastatin 10 mg Mild mitral regurgitation  ?PFO Thoracic aortic ectasia Hypertension.  He is here to review his labs. Since starting spironolactone according to him his blood pressure is much better controlled.  But also has postural dizziness.  He has no chest pain.  No PND orthopnea.  No palpitation dizziness lightheadedness near syncopal or syncopal event. He has no increased salt intake or alcohol intake.  We have reviewed labs, nuclear myocardial perfusion scan, echocardiogram.,  EKG.

## 2024-03-20 NOTE — PHYSICAL EXAM
[No Acute Distress] : no acute distress [Well Nourished] : well nourished [Well Developed] : well developed [No Carotid Bruit] : no carotid bruit [Normal Venous Pressure] : normal venous pressure [No Murmur] : no murmur [Normal S1, S2] : normal S1, S2 [No Rub] : no rub [No Gallop] : no gallop [Good Air Entry] : good air entry [Clear Lung Fields] : clear lung fields [No Respiratory Distress] : no respiratory distress  [Normal Gait] : normal gait [No Clubbing] : no clubbing [No Cyanosis] : no cyanosis [Normal Radial B/L] : normal radial B/L [No Varicosities] : no varicosities [Normal DP B/L] : normal DP B/L [Edema ___] : edema [unfilled] [Normal Speech] : normal speech [Alert and Oriented] : alert and oriented [No Edema] : no edema

## 2024-03-20 NOTE — CARDIOLOGY SUMMARY
[de-identified] : December 5, 2022.  Normal sinus rhythm EKG 3/13/23: SR with PACs at 60 bpm, CO interval 176 ms, QTc 428 ms, Q waves in lead III and nonspecific ST-T wave abnormalities  2/14/24 sinus bradycardia LBBB March 20, 2024 sinus bradycardia left bundle branch block [de-identified] : Event monitor.  April 2023.  PVCs PACs.  Brief atrial tachycardia.  Baseline normal sinus rhythm.  Average 66 [de-identified] : Pharmacological nuclear myocardial perfusion scan.  February 27, 2024.  EF 71%.  No evidence of infarction or inducible ischemia.  Artifact related to most likely left bundle branch block. [de-identified] : October 4, 2021 LV ejection fraction 60 to 65% mild mitral and aortic insufficiency ascending aorta 3.9 cm.  RVSP 23.  Possible PFO/small ASD.  Normal chamber dimensions. Echo 12/12/22: CONCLUSIONS: 1. Left ventricular ejection fraction is visually estimated at 60 to 65%. 2. Left ventricular systolic function is normal. 3. Mild aortic regurgitation. 4. Mild tricuspid regurgitation. 5. Normal pericardium. No pericardial effusion seen. 6. The proximal ascending aorta is borderline dilated. Ao 3.3 cm, Ao Asc 3.8 cm, Ao Asc prox 3.8 cm, Ao arch 3 cm February 14, 2024.  LVEF 60 to 65% ascending aorta 4.1 cm.  Preserved EF.  No significant wall motion abnormality. [de-identified] : Cerebral angiography.  April 2023.  No aneurysm. [de-identified] : Peripheral venous Doppler study October 1, 2021.  Bilateral no evidence of DVT and competent valves without significant reflux.  Baker's cyst.

## 2024-03-20 NOTE — DISCUSSION/SUMMARY
[FreeTextEntry1] : HIMANSHU MATA is a 68 year old M who presents today  with the above history and the following active issues:  Per patient report, neuro has stated they did not feel the aforementioned event was a stroke. His cerebral angiogram 4/5/23 demonstrated no angiographic evidence of intracranial aneurysm or vascular lesion was detected in this study.  Continue to control blood pressure heart rate cholesterol.  Follow recommendations by neurosurgery.  new LBBB.  No chest pain.  Good exercise tolerance.  Echocardiogram with preserved EF.  Pharm nuke showed no significant high risk ischemia.  Preserved EF.  Continue lifestyle and risk factor modification in absence of symptoms.  Will consider coronary calcium score for further evaluation once stable renal function and blood pressure.  paroxysmal atrial fibrillation/aflutter, status post cryoablation. No symptomatic recurrence. We discussed potential recurrence and instructions to contact us for symptoms. He has been taken off diltiazem and Xarelto by Dr. Rodrigues. Aspirin 81 mg in presence of multiple cardiovascular risk factors.  hypertension. No CHF, no renal insufficiency. Nonsmoker.  Thoracic aortic ectasia.Postural dizziness.  Acute renal insufficiency with hyperkalemia. Elevated metanephrine level.  Recommended hydration.  Stat plasma potassium and BMP again. Repeat metanephrine level.  Hold spironolactone and may need lower dose based on plasma potassium level.  Decrease dose of hydrochlorothiazide.  Stop amlodipine for now.  EKG left bundle branch block.  No hyperacute T waves.  Continue with labetalol.  Based on repeat labs will discuss further adjustment of antihypertensive therapy and evaluation with renal ultrasound as well as consideration regarding CT scan of the abdomen or MRI of the abdomen to evaluate for adrenal glands.  And as needed endocrinology evaluation. He is going straight to the labs.  And till we get the labs he will hold off with his spironolactone losartan/hydrochlorothiazide. Low-salt diet lower alcohol intake strongly recommended. Acute changes and renal function has been noted compared to before.  He does not have any signs of obstructive uropathy at present on clinical exam.  Venous insufficiency evaluation was negative. He has PMR. And on amlodipine. Recommended low-salt diet. Compression stockings. Continue lower dose of amlodipine.  dyslipidemia. Continue rosuvastatin 10 mg. Stable labs reviewed goals discussed if persistent LDL cholesterol greater than 70 consider increasing rosuvastatin to 20 mg.  mitral insufficiency. Aortic regurgitation. Borderline pulmonary artery systolic pressure.  Possible PFO. On ASA. Borderline aortic root dimension.  Relatively stable. Aortic root dimensions.Stable at present.  Counseling regarding low saturated fat, salt and carbohydrate intake was reviewed. Active lifestyle and regular. Exercise along with weight management is advised.  All the above were at length reviewed. Answered all the questions. Thank you very much for this kind referral. Please do not hesitate to give me a call for any question.  Part of this transcription was done with voice recognition software and phonetically similar errors are common. I apologize for that. Please do not hesitate to call for any questions due to above.    Sincerely,  Kellen Caraballo MD,FACC,FASDALJIT . [EKG obtained to assist in diagnosis and management of assessed problem(s)] : EKG obtained to assist in diagnosis and management of assessed problem(s)

## 2024-03-20 NOTE — REVIEW OF SYSTEMS
[Feeling Fatigued] : feeling fatigued [Lower Ext Edema] : lower extremity edema [Negative] : Heme/Lymph [FreeTextEntry2] : Postural dizziness. [FreeTextEntry5] : As noted in HPI [de-identified] : As noted in HPI

## 2024-04-06 NOTE — PHYSICAL EXAM
[Well Developed] : well developed [Well Nourished] : well nourished [No Acute Distress] : no acute distress [Normal Venous Pressure] : normal venous pressure [No Carotid Bruit] : no carotid bruit [Normal S1, S2] : normal S1, S2 [No Murmur] : no murmur [No Rub] : no rub [No Gallop] : no gallop [Clear Lung Fields] : clear lung fields [Good Air Entry] : good air entry [No Respiratory Distress] : no respiratory distress  [Normal Gait] : normal gait [No Edema] : no edema [No Cyanosis] : no cyanosis [No Clubbing] : no clubbing [No Varicosities] : no varicosities [Normal Radial B/L] : normal radial B/L [Normal DP B/L] : normal DP B/L [Edema ___] : edema [unfilled] [Normal Speech] : normal speech [Alert and Oriented] : alert and oriented

## 2024-04-07 ENCOUNTER — RX RENEWAL (OUTPATIENT)
Age: 69
End: 2024-04-07

## 2024-04-10 ENCOUNTER — APPOINTMENT (OUTPATIENT)
Dept: CARDIOLOGY | Facility: CLINIC | Age: 69
End: 2024-04-10
Payer: COMMERCIAL

## 2024-04-10 VITALS
OXYGEN SATURATION: 96 % | HEART RATE: 63 BPM | BODY MASS INDEX: 24.78 KG/M2 | SYSTOLIC BLOOD PRESSURE: 104 MMHG | HEIGHT: 73 IN | WEIGHT: 187 LBS | DIASTOLIC BLOOD PRESSURE: 68 MMHG

## 2024-04-10 VITALS — SYSTOLIC BLOOD PRESSURE: 114 MMHG | DIASTOLIC BLOOD PRESSURE: 70 MMHG

## 2024-04-10 DIAGNOSIS — I10 ESSENTIAL (PRIMARY) HYPERTENSION: ICD-10-CM

## 2024-04-10 DIAGNOSIS — I77.810 THORACIC AORTIC ECTASIA: ICD-10-CM

## 2024-04-10 DIAGNOSIS — E78.5 HYPERLIPIDEMIA, UNSPECIFIED: ICD-10-CM

## 2024-04-10 DIAGNOSIS — I48.91 UNSPECIFIED ATRIAL FIBRILLATION: ICD-10-CM

## 2024-04-10 DIAGNOSIS — I48.92 UNSPECIFIED ATRIAL FIBRILLATION: ICD-10-CM

## 2024-04-10 DIAGNOSIS — E87.5 HYPERKALEMIA: ICD-10-CM

## 2024-04-10 PROCEDURE — G2211 COMPLEX E/M VISIT ADD ON: CPT

## 2024-04-10 PROCEDURE — 99214 OFFICE O/P EST MOD 30 MIN: CPT

## 2024-04-10 RX ORDER — SPIRONOLACTONE 25 MG/1
25 TABLET ORAL
Qty: 45 | Refills: 3 | Status: ACTIVE | COMMUNITY
Start: 2024-02-14 | End: 1900-01-01

## 2024-04-10 NOTE — ASSESSMENT
[FreeTextEntry1] : Reviewed on June 6, 2023. Event monitor reviewed. May 23, 2023 sodium 141 potassium 4.1 creatinine 0.82 LFT normal triglycerides 111 HDL 6047 LDL 79  reviewed 2/14/24 ekg reviewed Labs from November 2023 also were reviewed showing stable CMP LDL 94 HDL 43  Reviewed on March 20, 2024. EKG as noted above Nuclear marker perfusion scan and echocardiogram as discussed and noted above. Most recent labs March 13, 2024 aldosterone level 17 metanephrine level 79 total free metanephrine level 222 mildly elevated.  Creatinine 1.46 potassium 5.8 sodium 139.  Reviewed 4/10/2024.  Sodium 137 potassium 5.0 creatinine 1.28 aldosterone and metanephrine normetanephrine level within normal range.

## 2024-04-10 NOTE — REVIEW OF SYSTEMS
[Feeling Fatigued] : feeling fatigued [Lower Ext Edema] : lower extremity edema [Negative] : Heme/Lymph [FreeTextEntry2] : Postural dizziness. [FreeTextEntry5] : As noted in HPI [de-identified] : As noted in HPI

## 2024-04-10 NOTE — CARDIOLOGY SUMMARY
[de-identified] : December 5, 2022.  Normal sinus rhythm EKG 3/13/23: SR with PACs at 60 bpm, PA interval 176 ms, QTc 428 ms, Q waves in lead III and nonspecific ST-T wave abnormalities  2/14/24 sinus bradycardia LBBB March 20, 2024 sinus bradycardia left bundle branch block [de-identified] : Event monitor.  April 2023.  PVCs PACs.  Brief atrial tachycardia.  Baseline normal sinus rhythm.  Average 66 [de-identified] : Pharmacological nuclear myocardial perfusion scan.  February 27, 2024.  EF 71%.  No evidence of infarction or inducible ischemia.  Artifact related to most likely left bundle branch block. [de-identified] : October 4, 2021 LV ejection fraction 60 to 65% mild mitral and aortic insufficiency ascending aorta 3.9 cm.  RVSP 23.  Possible PFO/small ASD.  Normal chamber dimensions. Echo 12/12/22: CONCLUSIONS: 1. Left ventricular ejection fraction is visually estimated at 60 to 65%. 2. Left ventricular systolic function is normal. 3. Mild aortic regurgitation. 4. Mild tricuspid regurgitation. 5. Normal pericardium. No pericardial effusion seen. 6. The proximal ascending aorta is borderline dilated. Ao 3.3 cm, Ao Asc 3.8 cm, Ao Asc prox 3.8 cm, Ao arch 3 cm February 14, 2024.  LVEF 60 to 65% ascending aorta 4.1 cm.  Preserved EF.  No significant wall motion abnormality. [de-identified] : Cerebral angiography.  April 2023.  No aneurysm. [de-identified] : Peripheral venous Doppler study October 1, 2021.  Bilateral no evidence of DVT and competent valves without significant reflux.  Baker's cyst.

## 2024-04-10 NOTE — HISTORY OF PRESENT ILLNESS
[FreeTextEntry1] : HIMANSHU MATA is a 68 year old male with a past medical history of:  HTN Atrial fibrillation. No recurrent symptoms. cryoablation in March 2018. He was taken off Xarelto and off diltiazem by Dr. Lilia Jones.  On rosuvastatin 10 mg Mild mitral regurgitation  ?PFO Thoracic aortic ectasia Hypertension.  He is here to review his labs. Since starting spironolactone according to him his blood pressure is much better controlled.  But was noted to have hypokalemia.  It was decreased to half dose.  And since then has no further dizziness.  Blood pressure at home is remaining very well-controlled.  And potassium is stable at 5.0 when last checked.  He has no chest pain.  No PND orthopnea.  No palpitation dizziness lightheadedness near syncopal or syncopal event. He has no increased salt intake or alcohol intake.

## 2024-04-10 NOTE — DISCUSSION/SUMMARY
[FreeTextEntry1] : HIMANSHU MATA is a 68 year old M who presents today  with the above history and the following active issues:  Per patient report, neuro has stated they did not feel the aforementioned event was a stroke. His cerebral angiogram 4/5/23 demonstrated no angiographic evidence of intracranial aneurysm or vascular lesion was detected in this study.  Continue to control blood pressure heart rate cholesterol.  Follow recommendations by neurosurgery.  new LBBB.  No chest pain.  Good exercise tolerance.  Echocardiogram with preserved EF.  Pharm nuke showed no significant high risk ischemia.  Preserved EF.  Continue lifestyle and risk factor modification in absence of symptoms.  Will consider coronary calcium score for further evaluation once stable renal function and blood pressure.  paroxysmal atrial fibrillation/aflutter, status post cryoablation. No symptomatic recurrence. We discussed potential recurrence and instructions to contact us for symptoms. He has been taken off diltiazem and Xarelto by Dr. Rodrigues. Aspirin 81 mg in presence of multiple cardiovascular risk factors.  hypertension. No CHF,  Nonsmoker.  Thoracic aortic ectasia.  Hyperkalemia resolved with lower dose of spironolactone.  Repeat aldosterone and metanephrine levels are within normal range.  Recommended hydration.  Continue with labetalol 100 mg twice daily.  Spironolactone 12.5 mg.  Valsartan/hydrochlorothiazide 320/12.5 mg.  Amlodipine was discontinued.  EKG left bundle branch block.   He will follow his blood pressure at home.  If remaining low or postural dizziness further reduction in dosage of medications can be done. Repeat BMP in 3 months and 6 months.  Or for any change in clinical status. Low-salt diet lower alcohol intake strongly recommended.  Venous insufficiency evaluation was negative. He has PMR. And on amlodipine. Recommended low-salt diet. Compression stockings. Continue lower dose of amlodipine.  dyslipidemia. Continue rosuvastatin 10 mg. Stable labs reviewed goals discussed if persistent LDL cholesterol greater than 70 consider increasing rosuvastatin to 20 mg.  mitral insufficiency. Aortic regurgitation. Borderline pulmonary artery systolic pressure.  Possible PFO. On ASA. Borderline aortic root dimension.  Relatively stable. Aortic root dimensions.Stable at present.  Counseling regarding low saturated fat, salt and carbohydrate intake was reviewed. Active lifestyle and regular. Exercise along with weight management is advised.  All the above were at length reviewed. Answered all the questions. Thank you very much for this kind referral. Please do not hesitate to give me a call for any question.  Part of this transcription was done with voice recognition software and phonetically similar errors are common. I apologize for that. Please do not hesitate to call for any questions due to above.    Sincerely,  Kellen Caraballo MD,FACC,FASE .

## 2024-05-21 ENCOUNTER — RX RENEWAL (OUTPATIENT)
Age: 69
End: 2024-05-21

## 2024-05-21 RX ORDER — LABETALOL HYDROCHLORIDE 100 MG/1
100 TABLET, FILM COATED ORAL
Qty: 180 | Refills: 3 | Status: ACTIVE | COMMUNITY
Start: 2023-03-29 | End: 1900-01-01

## 2024-07-10 ENCOUNTER — NON-APPOINTMENT (OUTPATIENT)
Age: 69
End: 2024-07-10

## 2024-10-22 ENCOUNTER — NON-APPOINTMENT (OUTPATIENT)
Age: 69
End: 2024-10-22

## 2024-10-22 ENCOUNTER — APPOINTMENT (OUTPATIENT)
Dept: CARDIOLOGY | Facility: CLINIC | Age: 69
End: 2024-10-22
Payer: COMMERCIAL

## 2024-10-22 VITALS
BODY MASS INDEX: 25.18 KG/M2 | HEIGHT: 73 IN | DIASTOLIC BLOOD PRESSURE: 70 MMHG | WEIGHT: 190 LBS | SYSTOLIC BLOOD PRESSURE: 120 MMHG | HEART RATE: 63 BPM | OXYGEN SATURATION: 97 %

## 2024-10-22 DIAGNOSIS — I48.91 UNSPECIFIED ATRIAL FIBRILLATION: ICD-10-CM

## 2024-10-22 DIAGNOSIS — E78.5 HYPERLIPIDEMIA, UNSPECIFIED: ICD-10-CM

## 2024-10-22 DIAGNOSIS — N64.4 MASTODYNIA: ICD-10-CM

## 2024-10-22 DIAGNOSIS — I48.92 UNSPECIFIED ATRIAL FIBRILLATION: ICD-10-CM

## 2024-10-22 DIAGNOSIS — I77.810 THORACIC AORTIC ECTASIA: ICD-10-CM

## 2024-10-22 DIAGNOSIS — I44.7 LEFT BUNDLE-BRANCH BLOCK, UNSPECIFIED: ICD-10-CM

## 2024-10-22 DIAGNOSIS — I10 ESSENTIAL (PRIMARY) HYPERTENSION: ICD-10-CM

## 2024-10-22 PROCEDURE — 99214 OFFICE O/P EST MOD 30 MIN: CPT

## 2024-10-22 PROCEDURE — G2211 COMPLEX E/M VISIT ADD ON: CPT | Mod: NC

## 2024-10-29 RX ORDER — EPLERENONE 25 MG/1
25 TABLET, COATED ORAL
Qty: 90 | Refills: 1 | Status: ACTIVE | COMMUNITY
Start: 2024-10-28 | End: 1900-01-01

## 2025-01-15 ENCOUNTER — APPOINTMENT (OUTPATIENT)
Dept: VASCULAR SURGERY | Facility: CLINIC | Age: 70
End: 2025-01-15

## 2025-01-15 VITALS
BODY MASS INDEX: 24.52 KG/M2 | DIASTOLIC BLOOD PRESSURE: 82 MMHG | HEIGHT: 73 IN | WEIGHT: 185 LBS | SYSTOLIC BLOOD PRESSURE: 154 MMHG

## 2025-01-15 DIAGNOSIS — K40.91 UNILATERAL INGUINAL HERNIA, W/OUT OBSTRUCTION OR GANGRENE, RECURRENT: ICD-10-CM

## 2025-01-15 PROCEDURE — 99203 OFFICE O/P NEW LOW 30 MIN: CPT

## 2025-01-15 NOTE — H&P PST ADULT - ASSESSMENT
63 yo male with pmhx HTN, chronic myalgia on prednisone and PAF (CHADSVASc-2, HTN, DM) maintained on cardizem who presents for PST for elective cryo AF ablation.  Recent Holter monitor PAF and  paroxysmal atrial flutter 52-160bpm, average HR 73bpm, PVC vs aberrancy. +symptoms of palpitation during PAF. No 63 yo male with pmhx HTN, chronic myalgia on prednisone and PAF (CHADSVASc-1, HTN) maintained on cardizem who presents for PST for elective cryo AF ablation.  Recent Holter monitor PAF and  paroxysmal atrial flutter 52-160bpm, average HR 73bpm, PVC vs aberrancy. +symptoms of palpitation during PAF.    -npo after midnight prior to procedure  -pt will verify anticoagulation coverage with his insurance, he is aware he will be starting this post procedure.

## 2025-01-27 ENCOUNTER — APPOINTMENT (OUTPATIENT)
Dept: VASCULAR SURGERY | Facility: AMBULATORY MEDICAL SERVICES | Age: 70
End: 2025-01-27
Payer: COMMERCIAL

## 2025-01-27 PROCEDURE — 49520 REREPAIR ING HERNIA REDUCE: CPT | Mod: LT

## 2025-02-05 ENCOUNTER — APPOINTMENT (OUTPATIENT)
Dept: VASCULAR SURGERY | Facility: CLINIC | Age: 70
End: 2025-02-05
Payer: COMMERCIAL

## 2025-02-05 VITALS
SYSTOLIC BLOOD PRESSURE: 138 MMHG | DIASTOLIC BLOOD PRESSURE: 84 MMHG | BODY MASS INDEX: 24.52 KG/M2 | WEIGHT: 185 LBS | HEIGHT: 73 IN

## 2025-02-05 DIAGNOSIS — Z87.19 OTHER SPECIFIED POSTPROCEDURAL STATES: ICD-10-CM

## 2025-02-05 DIAGNOSIS — Z98.890 OTHER SPECIFIED POSTPROCEDURAL STATES: ICD-10-CM

## 2025-02-05 PROCEDURE — 99024 POSTOP FOLLOW-UP VISIT: CPT

## 2025-02-28 ENCOUNTER — NON-APPOINTMENT (OUTPATIENT)
Age: 70
End: 2025-02-28

## 2025-03-22 ENCOUNTER — RX RENEWAL (OUTPATIENT)
Age: 70
End: 2025-03-22

## 2025-04-09 ENCOUNTER — NON-APPOINTMENT (OUTPATIENT)
Age: 70
End: 2025-04-09

## 2025-04-09 ENCOUNTER — APPOINTMENT (OUTPATIENT)
Dept: CARDIOLOGY | Facility: CLINIC | Age: 70
End: 2025-04-09
Payer: COMMERCIAL

## 2025-04-09 VITALS
HEIGHT: 73 IN | DIASTOLIC BLOOD PRESSURE: 68 MMHG | WEIGHT: 182 LBS | BODY MASS INDEX: 24.12 KG/M2 | SYSTOLIC BLOOD PRESSURE: 118 MMHG | HEART RATE: 63 BPM | OXYGEN SATURATION: 100 %

## 2025-04-09 DIAGNOSIS — I10 ESSENTIAL (PRIMARY) HYPERTENSION: ICD-10-CM

## 2025-04-09 DIAGNOSIS — E78.5 HYPERLIPIDEMIA, UNSPECIFIED: ICD-10-CM

## 2025-04-09 DIAGNOSIS — I48.91 UNSPECIFIED ATRIAL FIBRILLATION: ICD-10-CM

## 2025-04-09 DIAGNOSIS — R42 DIZZINESS AND GIDDINESS: ICD-10-CM

## 2025-04-09 DIAGNOSIS — I48.92 UNSPECIFIED ATRIAL FIBRILLATION: ICD-10-CM

## 2025-04-09 DIAGNOSIS — I77.810 THORACIC AORTIC ECTASIA: ICD-10-CM

## 2025-04-09 DIAGNOSIS — I44.7 LEFT BUNDLE-BRANCH BLOCK, UNSPECIFIED: ICD-10-CM

## 2025-04-09 PROCEDURE — 99214 OFFICE O/P EST MOD 30 MIN: CPT

## 2025-04-09 PROCEDURE — 93000 ELECTROCARDIOGRAM COMPLETE: CPT

## 2025-04-09 PROCEDURE — G2211 COMPLEX E/M VISIT ADD ON: CPT | Mod: NC

## 2025-04-09 RX ORDER — MELOXICAM 15 MG/1
15 TABLET ORAL
Refills: 0 | Status: ACTIVE | COMMUNITY

## 2025-05-19 NOTE — H&P PST ADULT - COMMENTS
S-(situation):   Patient calling back to request treatment for positive covid.    B-(background):   Patient seen in office today.  Patient states that he was instructed to go home, test, and to call back with results.    A-(assessment):   Patient states results are positive.    R-(recommendations):   Please review and advise.  Patient requesting call back.    ([facility] for pharmacy).        Jillian Alcantar RN, BSN  [facility] Triage           denies recent cough, fever, chills, dysuria, hematuria